# Patient Record
Sex: MALE | Race: WHITE | Employment: FULL TIME | URBAN - METROPOLITAN AREA
[De-identification: names, ages, dates, MRNs, and addresses within clinical notes are randomized per-mention and may not be internally consistent; named-entity substitution may affect disease eponyms.]

---

## 2017-01-10 ENCOUNTER — MED REC SCAN ONLY (OUTPATIENT)
Dept: FAMILY MEDICINE CLINIC | Facility: CLINIC | Age: 62
End: 2017-01-10

## 2017-06-19 ENCOUNTER — MED REC SCAN ONLY (OUTPATIENT)
Dept: FAMILY MEDICINE CLINIC | Facility: CLINIC | Age: 62
End: 2017-06-19

## 2017-08-01 ENCOUNTER — OFFICE VISIT (OUTPATIENT)
Dept: FAMILY MEDICINE CLINIC | Facility: CLINIC | Age: 62
End: 2017-08-01

## 2017-08-01 ENCOUNTER — TELEPHONE (OUTPATIENT)
Dept: FAMILY MEDICINE CLINIC | Facility: CLINIC | Age: 62
End: 2017-08-01

## 2017-08-01 VITALS
HEART RATE: 64 BPM | SYSTOLIC BLOOD PRESSURE: 122 MMHG | OXYGEN SATURATION: 97 % | DIASTOLIC BLOOD PRESSURE: 68 MMHG | BODY MASS INDEX: 27.74 KG/M2 | RESPIRATION RATE: 16 BRPM | WEIGHT: 183 LBS | HEIGHT: 68 IN | TEMPERATURE: 98 F

## 2017-08-01 DIAGNOSIS — Z12.11 SCREENING FOR COLON CANCER: ICD-10-CM

## 2017-08-01 DIAGNOSIS — F40.243 FEAR OF FLYING: ICD-10-CM

## 2017-08-01 DIAGNOSIS — Z13.29 SCREENING FOR ENDOCRINE, NUTRITIONAL, METABOLIC AND IMMUNITY DISORDER: ICD-10-CM

## 2017-08-01 DIAGNOSIS — Z13.0 SCREENING FOR ENDOCRINE, NUTRITIONAL, METABOLIC AND IMMUNITY DISORDER: ICD-10-CM

## 2017-08-01 DIAGNOSIS — Z13.228 SCREENING FOR ENDOCRINE, NUTRITIONAL, METABOLIC AND IMMUNITY DISORDER: ICD-10-CM

## 2017-08-01 DIAGNOSIS — Z12.5 SCREENING FOR PROSTATE CANCER: ICD-10-CM

## 2017-08-01 DIAGNOSIS — Z00.00 ENCOUNTER FOR ANNUAL PHYSICAL EXAM: Primary | ICD-10-CM

## 2017-08-01 DIAGNOSIS — N40.0 BENIGN PROSTATIC HYPERPLASIA, UNSPECIFIED WHETHER LOWER URINARY TRACT SYMPTOMS PRESENT: ICD-10-CM

## 2017-08-01 DIAGNOSIS — Z13.21 ENCOUNTER FOR VITAMIN DEFICIENCY SCREENING: ICD-10-CM

## 2017-08-01 DIAGNOSIS — Z13.21 SCREENING FOR ENDOCRINE, NUTRITIONAL, METABOLIC AND IMMUNITY DISORDER: ICD-10-CM

## 2017-08-01 DIAGNOSIS — Z78.9 VEGAN DIET: ICD-10-CM

## 2017-08-01 PROCEDURE — 99396 PREV VISIT EST AGE 40-64: CPT | Performed by: EMERGENCY MEDICINE

## 2017-08-01 RX ORDER — ALPRAZOLAM 1 MG/1
TABLET ORAL
Qty: 20 TABLET | Refills: 0 | Status: SHIPPED | OUTPATIENT
Start: 2017-08-01 | End: 2019-06-20

## 2017-08-01 NOTE — PROGRESS NOTES
Chief Complaint:   Patient presents with: Annual    HPI:   This is a 63year old male who present for a yearly annual exam    WELL-MALE EXAM     REFILL MEDICATION  Pt states that he has a fear of flying.  Chest chest palpitations and SOB and very anxious past five years? YES        e. Have you had a tetanus shot  in the past 10 years? NO, refuses          f. Does your house have a working       smoke detector? YES        g. Do you have firearms at home? NO        h.  How many sexual partners have /68 (BP Location: Right arm, Patient Position: Sitting, Cuff Size: large)   Pulse 64   Temp 98.3 °F (36.8 °C) (Oral)   Resp 16   Ht 68\"   Wt 183 lb   SpO2 97%   BMI 27.83 kg/m²  Estimated body mass index is 27.83 kg/m² as calculated from the follo vitamin deficiency screening  -     VITAMIN D, 25-HYDROXY; Future  -     VITAMIN B12; Future  -     FOLIC ACID SERUM(FOLATE); Future    Screening for colon cancer  Will initiate Cologuard testing. Information provided.   Given CPT codes and also advised to

## 2017-08-01 NOTE — TELEPHONE ENCOUNTER
Pt given Cologuard informational pamphlet at [address] with instructions to call insurance to determine coverage. Awaiting call back. If covered by patient insurance, completed Cologuard form is located in Dr. Cabrera Lance 'pending' fillomena file to be faxed.

## 2017-08-01 NOTE — PATIENT INSTRUCTIONS
Thank you for choosing [facility]  To Do:  FOR WILLIS ELLIS  1. Call your insurance regarding Cologuard coverage, give the CPT codeLet us know if it not covered so we can refer you for a colonoscopy  2.  If not covered, do stool cards X 3 are best for you   Depression All men in this age group At routine exams   Type 2 diabetes or prediabetes All adults beginning at age 39 and adults without symptoms at any age who are overweight or obese and have 1 or more other risk factors for diabetes A 1 to 3 doses   Influenza (flu) All men in this age group Once a year   Measles, mumps, rubella (MMR) Men in this age group through their late 46s who have no record of these infections or vaccines 1 or 2 dose; ask your healthcare provider   Meningococcal M

## 2017-08-02 ENCOUNTER — LAB ENCOUNTER (OUTPATIENT)
Dept: LAB | Age: 62
End: 2017-08-02
Attending: EMERGENCY MEDICINE
Payer: COMMERCIAL

## 2017-08-02 DIAGNOSIS — Z13.29 SCREENING FOR ENDOCRINE, NUTRITIONAL, METABOLIC AND IMMUNITY DISORDER: ICD-10-CM

## 2017-08-02 DIAGNOSIS — Z13.21 ENCOUNTER FOR VITAMIN DEFICIENCY SCREENING: ICD-10-CM

## 2017-08-02 DIAGNOSIS — Z78.9 VEGAN DIET: ICD-10-CM

## 2017-08-02 DIAGNOSIS — Z12.5 SCREENING FOR PROSTATE CANCER: ICD-10-CM

## 2017-08-02 DIAGNOSIS — Z13.228 SCREENING FOR ENDOCRINE, NUTRITIONAL, METABOLIC AND IMMUNITY DISORDER: ICD-10-CM

## 2017-08-02 DIAGNOSIS — Z13.21 SCREENING FOR ENDOCRINE, NUTRITIONAL, METABOLIC AND IMMUNITY DISORDER: ICD-10-CM

## 2017-08-02 DIAGNOSIS — Z13.0 SCREENING FOR ENDOCRINE, NUTRITIONAL, METABOLIC AND IMMUNITY DISORDER: ICD-10-CM

## 2017-08-02 LAB
25-HYDROXYVITAMIN D (TOTAL): 61.6 NG/ML (ref 30–100)
ALBUMIN SERPL-MCNC: 3.7 G/DL (ref 3.5–4.8)
ALP LIVER SERPL-CCNC: 84 U/L (ref 45–117)
ALT SERPL-CCNC: 24 U/L (ref 17–63)
AST SERPL-CCNC: 23 U/L (ref 15–41)
BASOPHILS # BLD AUTO: 0.07 X10(3) UL (ref 0–0.1)
BASOPHILS NFR BLD AUTO: 1.4 %
BILIRUB SERPL-MCNC: 0.6 MG/DL (ref 0.1–2)
BUN BLD-MCNC: 11 MG/DL (ref 8–20)
CALCIUM BLD-MCNC: 8.9 MG/DL (ref 8.3–10.3)
CHLORIDE: 106 MMOL/L (ref 101–111)
CHOLEST SMN-MCNC: 172 MG/DL (ref ?–200)
CO2: 24 MMOL/L (ref 22–32)
COMPLEXED PSA SERPL-MCNC: 2.81 NG/ML (ref 0.01–4)
CREAT BLD-MCNC: 1.03 MG/DL (ref 0.7–1.3)
EOSINOPHIL # BLD AUTO: 0.14 X10(3) UL (ref 0–0.3)
EOSINOPHIL NFR BLD AUTO: 2.9 %
ERYTHROCYTE [DISTWIDTH] IN BLOOD BY AUTOMATED COUNT: 12.6 % (ref 11.5–16)
FOLATE (FOLIC ACID), SERUM: 17.9 NG/ML (ref 8.7–24)
GLUCOSE BLD-MCNC: 98 MG/DL (ref 70–99)
HAV AB SERPL IA-ACNC: 648 PG/ML (ref 193–986)
HCT VFR BLD AUTO: 43.4 % (ref 37–53)
HDLC SERPL-MCNC: 52 MG/DL (ref 45–?)
HDLC SERPL: 3.31 {RATIO} (ref ?–4.97)
HGB BLD-MCNC: 14.8 G/DL (ref 13–17)
IMMATURE GRANULOCYTE COUNT: 0.01 X10(3) UL (ref 0–1)
IMMATURE GRANULOCYTE RATIO %: 0.2 %
LDLC SERPL CALC-MCNC: 107 MG/DL (ref ?–130)
LDLC SERPL-MCNC: 13 MG/DL (ref 5–40)
LYMPHOCYTES # BLD AUTO: 0.98 X10(3) UL (ref 0.9–4)
LYMPHOCYTES NFR BLD AUTO: 20.2 %
M PROTEIN MFR SERPL ELPH: 6.7 G/DL (ref 6.1–8.3)
MCH RBC QN AUTO: 30.8 PG (ref 27–33.2)
MCHC RBC AUTO-ENTMCNC: 34.1 G/DL (ref 31–37)
MCV RBC AUTO: 90.2 FL (ref 80–99)
MONOCYTES # BLD AUTO: 0.51 X10(3) UL (ref 0.1–0.6)
MONOCYTES NFR BLD AUTO: 10.5 %
NEUTROPHIL ABS PRELIM: 3.13 X10 (3) UL (ref 1.3–6.7)
NEUTROPHILS # BLD AUTO: 3.13 X10(3) UL (ref 1.3–6.7)
NEUTROPHILS NFR BLD AUTO: 64.8 %
NONHDLC SERPL-MCNC: 120 MG/DL (ref ?–130)
PLATELET # BLD AUTO: 213 10(3)UL (ref 150–450)
POTASSIUM SERPL-SCNC: 4.1 MMOL/L (ref 3.6–5.1)
RBC # BLD AUTO: 4.81 X10(6)UL (ref 4.3–5.7)
RED CELL DISTRIBUTION WIDTH-SD: 41.8 FL (ref 35.1–46.3)
SODIUM SERPL-SCNC: 138 MMOL/L (ref 136–144)
TRIGLYCERIDES: 65 MG/DL (ref ?–150)
TSI SER-ACNC: 1.94 MIU/ML (ref 0.35–5.5)
WBC # BLD AUTO: 4.8 X10(3) UL (ref 4–13)

## 2017-08-02 PROCEDURE — 82746 ASSAY OF FOLIC ACID SERUM: CPT | Performed by: EMERGENCY MEDICINE

## 2017-08-02 PROCEDURE — 36415 COLL VENOUS BLD VENIPUNCTURE: CPT | Performed by: EMERGENCY MEDICINE

## 2017-08-02 PROCEDURE — 84153 ASSAY OF PSA TOTAL: CPT | Performed by: EMERGENCY MEDICINE

## 2017-08-02 PROCEDURE — 82607 VITAMIN B-12: CPT | Performed by: EMERGENCY MEDICINE

## 2017-08-02 PROCEDURE — 80050 GENERAL HEALTH PANEL: CPT | Performed by: EMERGENCY MEDICINE

## 2017-08-02 PROCEDURE — 80061 LIPID PANEL: CPT | Performed by: EMERGENCY MEDICINE

## 2017-08-02 PROCEDURE — 82306 VITAMIN D 25 HYDROXY: CPT | Performed by: EMERGENCY MEDICINE

## 2017-08-08 ENCOUNTER — TELEPHONE (OUTPATIENT)
Dept: FAMILY MEDICINE CLINIC | Facility: CLINIC | Age: 62
End: 2017-08-08

## 2017-08-08 NOTE — TELEPHONE ENCOUNTER
LMOM that patient can review labs with nurse. Patient was supposed to inquire through insurance if colo guard screening was covered, please inquire.      Future Appointments  Date Time Provider Arthur Stubbs   9/14/2017 3:30 PM Emmett Quiles MD [id number]

## 2017-12-15 ENCOUNTER — OFFICE VISIT (OUTPATIENT)
Dept: FAMILY MEDICINE CLINIC | Facility: CLINIC | Age: 62
End: 2017-12-15

## 2017-12-15 VITALS
RESPIRATION RATE: 16 BRPM | HEART RATE: 70 BPM | SYSTOLIC BLOOD PRESSURE: 120 MMHG | BODY MASS INDEX: 29.01 KG/M2 | HEIGHT: 67.5 IN | OXYGEN SATURATION: 98 % | WEIGHT: 187 LBS | DIASTOLIC BLOOD PRESSURE: 80 MMHG | TEMPERATURE: 98 F

## 2017-12-15 DIAGNOSIS — H00.022 HORDEOLUM INTERNUM OF RIGHT LOWER EYELID: Primary | ICD-10-CM

## 2017-12-15 PROCEDURE — 99213 OFFICE O/P EST LOW 20 MIN: CPT | Performed by: PHYSICIAN ASSISTANT

## 2017-12-15 RX ORDER — ERYTHROMYCIN 5 MG/G
OINTMENT OPHTHALMIC
Qty: 3.5 G | Refills: 0 | Status: SHIPPED | OUTPATIENT
Start: 2017-12-15 | End: 2018-08-03 | Stop reason: ALTCHOICE

## 2017-12-15 NOTE — PATIENT INSTRUCTIONS
1. Erythromycin  2. Warm compresses four times daily  3. Follow up with ophthalmology   4. If worsening symptoms seek treatment    Sty (or Stye)  A sty is an infection of the oil gland of the eyelid.  It may develop into a small pocket of pus (an abscess) Date Last Reviewed: 8/1/2017  © 7337-8538 [employer] [address]. [address] [facility] [place], [address] [place]. All rights reserved. This information is not intended as a substitute for professional medical care.  Always follow your healthcare professional'

## 2017-12-15 NOTE — PROGRESS NOTES
CHIEF COMPLAINT:   Patient presents with:  Eye Problem: Possible sty Right eye. HPI:   Jaylon Eldridge is a 63year old male who presents with chief complaint of right eye lower lid swelling and redness. Symptoms began  2  days ago.  Symptoms have Smokeless tobacco: Never Used                      Alcohol use: Yes              Comment: rare        REVIEW OF SYSTEMS:   GENERAL: feels well otherwise  SKIN: no rashes  EYES:denies blurred vision or double vision.  See HPI  HENT: denies ear pain, congesti A sty is an infection of the oil gland of the eyelid. It may develop into a small pocket of pus (an abscess). This can cause pain, redness, and swelling.  In early stages, a sty is treated with antibiotic cream, eye drops, or a small towel soaked in warm wa © 3692-7423 [employer] [address]. 1407 [facility] [place], [address] [employer]. All rights reserved. This information is not intended as a substitute for professional medical care. Always follow your healthcare professional's instructions.             Tej

## 2017-12-18 ENCOUNTER — PATIENT MESSAGE (OUTPATIENT)
Dept: FAMILY MEDICINE CLINIC | Facility: CLINIC | Age: 62
End: 2017-12-18

## 2017-12-19 NOTE — TELEPHONE ENCOUNTER
From: Natali Gutierrez  To: Rashad Queen MD  Sent: 12/18/2017 12:46 PM CST  Subject: Non-Urgent Medical Question    I'm trying to find out my blood type. I do not see it in [employer]. How do I get access to this information?

## 2018-03-30 ENCOUNTER — TELEPHONE (OUTPATIENT)
Dept: FAMILY MEDICINE CLINIC | Facility: CLINIC | Age: 63
End: 2018-03-30

## 2018-08-03 ENCOUNTER — HOSPITAL ENCOUNTER (OUTPATIENT)
Dept: GENERAL RADIOLOGY | Age: 63
Discharge: HOME | End: 2018-08-03
Attending: EMERGENCY MEDICINE
Payer: COMMERCIAL

## 2018-08-03 ENCOUNTER — OFFICE VISIT (OUTPATIENT)
Dept: FAMILY MEDICINE CLINIC | Facility: CLINIC | Age: 63
End: 2018-08-03
Payer: COMMERCIAL

## 2018-08-03 ENCOUNTER — LAB ENCOUNTER (OUTPATIENT)
Dept: LAB | Age: 63
End: 2018-08-03
Attending: EMERGENCY MEDICINE
Payer: COMMERCIAL

## 2018-08-03 VITALS
DIASTOLIC BLOOD PRESSURE: 82 MMHG | WEIGHT: 181 LBS | TEMPERATURE: 99 F | HEIGHT: 67.5 IN | HEART RATE: 76 BPM | SYSTOLIC BLOOD PRESSURE: 120 MMHG | BODY MASS INDEX: 28.08 KG/M2 | OXYGEN SATURATION: 97 % | RESPIRATION RATE: 15 BRPM

## 2018-08-03 DIAGNOSIS — Z82.49 FAMILY HISTORY OF HEART DISEASE IN MALE FAMILY MEMBER BEFORE AGE 55: ICD-10-CM

## 2018-08-03 DIAGNOSIS — Z13.0 SCREENING FOR ENDOCRINE, NUTRITIONAL, METABOLIC AND IMMUNITY DISORDER: ICD-10-CM

## 2018-08-03 DIAGNOSIS — Z00.00 ENCOUNTER FOR ANNUAL PHYSICAL EXAM: Primary | ICD-10-CM

## 2018-08-03 DIAGNOSIS — Z13.228 SCREENING FOR ENDOCRINE, NUTRITIONAL, METABOLIC AND IMMUNITY DISORDER: ICD-10-CM

## 2018-08-03 DIAGNOSIS — Z12.11 SCREENING FOR COLON CANCER: ICD-10-CM

## 2018-08-03 DIAGNOSIS — Z13.21 SCREENING FOR ENDOCRINE, NUTRITIONAL, METABOLIC AND IMMUNITY DISORDER: ICD-10-CM

## 2018-08-03 DIAGNOSIS — Z13.29 SCREENING FOR ENDOCRINE, NUTRITIONAL, METABOLIC AND IMMUNITY DISORDER: ICD-10-CM

## 2018-08-03 DIAGNOSIS — Z28.21 REFUSES TETANUS, DIPHTHERIA, AND ACELLULAR PERTUSSIS (TDAP) VACCINATION: ICD-10-CM

## 2018-08-03 DIAGNOSIS — M46.1 SACROILIITIS (HCC): ICD-10-CM

## 2018-08-03 DIAGNOSIS — Z12.5 PROSTATE CANCER SCREENING: ICD-10-CM

## 2018-08-03 LAB
ALBUMIN SERPL-MCNC: 4 G/DL (ref 3.5–4.8)
ALBUMIN/GLOB SERPL: 1.3 {RATIO} (ref 1–2)
ALP LIVER SERPL-CCNC: 87 U/L (ref 45–117)
ALT SERPL-CCNC: 28 U/L (ref 17–63)
ANION GAP SERPL CALC-SCNC: 6 MMOL/L (ref 0–18)
AST SERPL-CCNC: 25 U/L (ref 15–41)
BASOPHILS # BLD AUTO: 0.06 X10(3) UL (ref 0–0.1)
BASOPHILS NFR BLD AUTO: 0.9 %
BILIRUB SERPL-MCNC: 0.6 MG/DL (ref 0.1–2)
BUN BLD-MCNC: 8 MG/DL (ref 8–20)
BUN/CREAT SERPL: 7 (ref 10–20)
CALCIUM BLD-MCNC: 9 MG/DL (ref 8.3–10.3)
CHLORIDE SERPL-SCNC: 106 MMOL/L (ref 101–111)
CHOLEST SMN-MCNC: 153 MG/DL (ref ?–200)
CO2 SERPL-SCNC: 26 MMOL/L (ref 22–32)
COMPLEXED PSA SERPL-MCNC: 3.62 NG/ML (ref 0.01–4)
CREAT BLD-MCNC: 1.14 MG/DL (ref 0.7–1.3)
EOSINOPHIL # BLD AUTO: 0.06 X10(3) UL (ref 0–0.3)
EOSINOPHIL NFR BLD AUTO: 0.9 %
ERYTHROCYTE [DISTWIDTH] IN BLOOD BY AUTOMATED COUNT: 13 % (ref 11.5–16)
GLOBULIN PLAS-MCNC: 3.1 G/DL (ref 2.5–3.7)
GLUCOSE BLD-MCNC: 87 MG/DL (ref 70–99)
HAV AB SERPL IA-ACNC: 719 PG/ML (ref 193–986)
HCT VFR BLD AUTO: 43.9 % (ref 37–53)
HDLC SERPL-MCNC: 49 MG/DL (ref 40–59)
HGB BLD-MCNC: 15.3 G/DL (ref 13–17)
IMMATURE GRANULOCYTE COUNT: 0.01 X10(3) UL (ref 0–1)
IMMATURE GRANULOCYTE RATIO %: 0.1 %
LDLC SERPL CALC-MCNC: 91 MG/DL (ref ?–100)
LYMPHOCYTES # BLD AUTO: 1.24 X10(3) UL (ref 0.9–4)
LYMPHOCYTES NFR BLD AUTO: 17.6 %
M PROTEIN MFR SERPL ELPH: 7.1 G/DL (ref 6.1–8.3)
MCH RBC QN AUTO: 31.2 PG (ref 27–33.2)
MCHC RBC AUTO-ENTMCNC: 34.9 G/DL (ref 31–37)
MCV RBC AUTO: 89.4 FL (ref 80–99)
MONOCYTES # BLD AUTO: 0.7 X10(3) UL (ref 0.1–1)
MONOCYTES NFR BLD AUTO: 9.9 %
NEUTROPHIL ABS PRELIM: 4.97 X10 (3) UL (ref 1.3–6.7)
NEUTROPHILS # BLD AUTO: 4.97 X10(3) UL (ref 1.3–6.7)
NEUTROPHILS NFR BLD AUTO: 70.6 %
NONHDLC SERPL-MCNC: 104 MG/DL (ref ?–130)
OSMOLALITY SERPL CALC.SUM OF ELEC: 284 MOSM/KG (ref 275–295)
PLATELET # BLD AUTO: 216 10(3)UL (ref 150–450)
POTASSIUM SERPL-SCNC: 4 MMOL/L (ref 3.6–5.1)
RBC # BLD AUTO: 4.91 X10(6)UL (ref 4.3–5.7)
RED CELL DISTRIBUTION WIDTH-SD: 42 FL (ref 35.1–46.3)
SODIUM SERPL-SCNC: 138 MMOL/L (ref 136–144)
TRIGL SERPL-MCNC: 66 MG/DL (ref 30–149)
TSI SER-ACNC: 1.32 MIU/ML (ref 0.35–5.5)
VIT D+METAB SERPL-MCNC: 66.9 NG/ML (ref 30–100)
VLDLC SERPL CALC-MCNC: 13 MG/DL (ref 0–30)
WBC # BLD AUTO: 7 X10(3) UL (ref 4–13)

## 2018-08-03 PROCEDURE — 82607 VITAMIN B-12: CPT | Performed by: EMERGENCY MEDICINE

## 2018-08-03 PROCEDURE — 80050 GENERAL HEALTH PANEL: CPT | Performed by: EMERGENCY MEDICINE

## 2018-08-03 PROCEDURE — 84153 ASSAY OF PSA TOTAL: CPT | Performed by: EMERGENCY MEDICINE

## 2018-08-03 PROCEDURE — 99396 PREV VISIT EST AGE 40-64: CPT | Performed by: EMERGENCY MEDICINE

## 2018-08-03 PROCEDURE — 72202 X-RAY EXAM SI JOINTS 3/> VWS: CPT | Performed by: EMERGENCY MEDICINE

## 2018-08-03 PROCEDURE — 36415 COLL VENOUS BLD VENIPUNCTURE: CPT | Performed by: EMERGENCY MEDICINE

## 2018-08-03 PROCEDURE — 80061 LIPID PANEL: CPT | Performed by: EMERGENCY MEDICINE

## 2018-08-03 PROCEDURE — 82306 VITAMIN D 25 HYDROXY: CPT | Performed by: EMERGENCY MEDICINE

## 2018-08-03 NOTE — PATIENT INSTRUCTIONS
Thank you for choosing [facility]  To Do:  FOR Nayeli ELLIS    · Follow up with cardiology as scheduled, BP meds per  Cardiology  · Have blood tests done after fasting  · Follow up yearly for annual physical  · Flu sshot in the fall  · Xr cancer All men in this age group Flexible sigmoidoscopy every 5 years, or colonoscopy every 10 years, or double-contrast barium enema every 5 years; yearly fecal occult blood test or fecal immunochemical test; or a stool DNA test as often as your healthcar your healthcare provider 2 doses given at least 6 months apart   Hepatitis B Men at increased risk for infection – talk with your healthcare provider 3 doses over 6 months; second dose should be given 1 month after the first dose; the third dose should be [address]. 1407 [facility] [place], [address] [place] [address]. All rights reserved. This information is not intended as a substitute for professional medical care. Always follow your healthcare professional's instructions.

## 2018-08-03 NOTE — PROGRESS NOTES
Chief Complaint:   Patient presents with:  Physical: Annual physical     HPI:   This is a 66year old male who present for a yearly annual exam    WELL-MALE EXAM     1. Age:   66   2. Have you had any of the following problems:         a.  High blood pr date: TONSILLECTOMY  Social History:  Smoking status: Never Smoker                                                              Smokeless tobacco: Never Used                      Alcohol use:  Yes              Comment: rare    Family History:  Family Histor supple, no adenopathy, no thyromegaly  LUNGS: clear to auscultation, no RRW  CARDIO: RRR without murmur  EXTREMITIES: no cyanosis, clubbing or edema  GI:soft Nontender Normoactive BS.   No palpable masses no guarding rigidity no rebound tenderness  BACK: Ba file.      · Follow up with cardiology as scheduled, BP meds per  Cardiology  · Have blood tests done after fasting  · Follow up yearly for annual physical  · Flu sshot in the fall  · Xray of back today  · Arrange for physical therapy  · OTC tylenol or Mot

## 2018-08-06 ENCOUNTER — TELEPHONE (OUTPATIENT)
Dept: FAMILY MEDICINE CLINIC | Facility: CLINIC | Age: 63
End: 2018-08-06

## 2018-08-06 DIAGNOSIS — R97.20 ELEVATED PSA: Primary | ICD-10-CM

## 2018-08-06 NOTE — TELEPHONE ENCOUNTER
MC    PSR: OK to lync me if/when pt calls back today, while I am in triage. Thank you. Urology referral placed for Dr. Shirley Casillas.

## 2018-08-06 NOTE — TELEPHONE ENCOUNTER
----- Message from Raghavendra Mares MD sent at 8/6/2018 12:38 PM CDT -----  PSA has increased significantly from 1 year ago.  Pls refer patient to Urology, Dr. Jerry Watt    Otherwise, labs all stable    XR SACROILIAC JOINTS (MIN 3 VIEWS) (CPT=72202)   Order: 21

## 2018-08-07 PROBLEM — M46.1 SACROILIITIS (HCC): Status: ACTIVE | Noted: 2018-08-07

## 2018-08-13 NOTE — TELEPHONE ENCOUNTER
Spoke with pt regarding results and instructions listed below. Pt verbalizes understanding. Pt requested that he continue to follow up with his urologist through [id number] [facility], Dr. Saleem Canada in [place].  Requested that labs be sent to his urolo

## 2018-09-11 ENCOUNTER — OFFICE VISIT (OUTPATIENT)
Dept: PHYSICAL THERAPY | Age: 63
End: 2018-09-11
Attending: EMERGENCY MEDICINE
Payer: COMMERCIAL

## 2018-09-11 DIAGNOSIS — M46.1 SACROILIITIS (HCC): ICD-10-CM

## 2018-09-11 PROCEDURE — 97530 THERAPEUTIC ACTIVITIES: CPT

## 2018-09-11 PROCEDURE — 97161 PT EVAL LOW COMPLEX 20 MIN: CPT

## 2018-09-11 NOTE — PROGRESS NOTES
LOWER EXTREMITY EVALUATION:   Referring Physician: Dr. Timi Bridges  Diagnosis: Left Hip Capsular Restriction     Date of Service: 9/11/2018     PATIENT Shawanda Landis is a 66year old y/o male who presents to therapy today with complaints of the hips and pelvis. He toes out on the left side. There is an increase in boggy effusion to palpation along the lumbar spine. Gait: The patient ambulates with a decrease in hip extension across the bilateral hips.     Palpation: No increase in pain to postural limitations and be able to correct them independently. 3. Patient will have an increase in posterior hip capsule mobility to min limitations in order to return to walking, squatting, and exercising.     4. Patient will have an increase in bilate

## 2018-09-14 ENCOUNTER — APPOINTMENT (OUTPATIENT)
Dept: PHYSICAL THERAPY | Age: 63
End: 2018-09-14
Attending: EMERGENCY MEDICINE
Payer: COMMERCIAL

## 2018-09-19 ENCOUNTER — APPOINTMENT (OUTPATIENT)
Dept: PHYSICAL THERAPY | Age: 63
End: 2018-09-19
Attending: EMERGENCY MEDICINE
Payer: COMMERCIAL

## 2018-09-21 ENCOUNTER — APPOINTMENT (OUTPATIENT)
Dept: PHYSICAL THERAPY | Age: 63
End: 2018-09-21
Attending: EMERGENCY MEDICINE
Payer: COMMERCIAL

## 2018-09-26 ENCOUNTER — APPOINTMENT (OUTPATIENT)
Dept: PHYSICAL THERAPY | Age: 63
End: 2018-09-26
Attending: EMERGENCY MEDICINE
Payer: COMMERCIAL

## 2018-09-26 PROCEDURE — 97140 MANUAL THERAPY 1/> REGIONS: CPT

## 2018-09-26 PROCEDURE — 97110 THERAPEUTIC EXERCISES: CPT

## 2018-09-26 NOTE — PROGRESS NOTES
Dx: Left Hip Stiffness and pain         Authorized # of Visits:  8         Next MD visit: none scheduled  Fall Risk: standard         Precautions: n/a             Subjective: States that he is continuing to have left leg pain.   Describes this more as a mus

## 2018-09-28 ENCOUNTER — OFFICE VISIT (OUTPATIENT)
Dept: PHYSICAL THERAPY | Age: 63
End: 2018-09-28
Attending: EMERGENCY MEDICINE
Payer: COMMERCIAL

## 2018-09-28 PROCEDURE — 97140 MANUAL THERAPY 1/> REGIONS: CPT

## 2018-09-28 PROCEDURE — 97110 THERAPEUTIC EXERCISES: CPT

## 2018-09-28 NOTE — PROGRESS NOTES
Dx: Left Hip Stiffness and pain         Authorized # of Visits:  8         Next MD visit: none scheduled  Fall Risk: standard         Precautions: n/a             Subjective: States that he is continuing to have left leg pain.   Having some knee pain from t Treatment: 40 min  Total Treatment Time: 40 min

## 2018-10-03 ENCOUNTER — OFFICE VISIT (OUTPATIENT)
Dept: PHYSICAL THERAPY | Age: 63
End: 2018-10-03
Attending: EMERGENCY MEDICINE
Payer: COMMERCIAL

## 2018-10-03 PROCEDURE — 97110 THERAPEUTIC EXERCISES: CPT

## 2018-10-03 PROCEDURE — 97140 MANUAL THERAPY 1/> REGIONS: CPT

## 2018-10-03 NOTE — PROGRESS NOTES
Dx: Left Hip Stiffness and pain         Authorized # of Visits:  8         Next MD visit: none scheduled  Fall Risk: standard         Precautions: n/a             Subjective: States that the hip is stiff.   Had to crawl around the floor a bit at work and no hip flexor in order to return to long distance gait and exercise. Plan: Assess response and progress as tolerated. Charges: TherEx 2 (25 min);  Manual PT 1 (15 min)       Total Timed Treatment: 40 min  Total Treatment Time: 40 min

## 2018-10-05 ENCOUNTER — OFFICE VISIT (OUTPATIENT)
Dept: PHYSICAL THERAPY | Age: 63
End: 2018-10-05
Attending: EMERGENCY MEDICINE
Payer: COMMERCIAL

## 2018-10-05 PROCEDURE — 97110 THERAPEUTIC EXERCISES: CPT

## 2018-10-05 PROCEDURE — 97140 MANUAL THERAPY 1/> REGIONS: CPT

## 2018-10-05 NOTE — PROGRESS NOTES
Dx: Left Hip Stiffness and pain         Authorized # of Visits:  8         Next MD visit: none scheduled  Fall Risk: standard         Precautions: n/a             Subjective: Feeling better. Some stiffness. Left hip more-so than the right.       Objective squatting, and exercising. 4. Patient will have an increase in bilateral hip strength to assist with returning to exercise routine  5.  Patient will demonstrate an increase in MLT of of the hamstring rectus femoris, and hip flexor in order to return to l

## 2018-10-08 ENCOUNTER — TELEPHONE (OUTPATIENT)
Dept: FAMILY MEDICINE CLINIC | Facility: CLINIC | Age: 63
End: 2018-10-08

## 2018-10-08 DIAGNOSIS — M25.552 PAIN OF LEFT HIP JOINT: Primary | ICD-10-CM

## 2018-10-08 NOTE — TELEPHONE ENCOUNTER
Pt states that when he was at physical therapy last they recommended him to get an xray of his left hip.

## 2018-10-08 NOTE — TELEPHONE ENCOUNTER
Please see below message. Last PT appointment: 10/5/18. Please advise on hip xray for patient. Thank you!

## 2018-10-09 NOTE — TELEPHONE ENCOUNTER
VM left for patient. I explained that I would send him a my chart with instructions from the doctor. Call back or send my chart w/ questions or concerns.

## 2018-10-09 NOTE — TELEPHONE ENCOUNTER
PSR: When patient calls back please verify PCP. PCP listed is Dr. Billy Davis however, patient has been seeing Dr. Gill Blizzard. Thank you! LM on pt cell phone to call the office back. Unable to leave detailed message per HIPAA.

## 2018-10-09 NOTE — TELEPHONE ENCOUNTER
Pt called back. Pt states that he doesn't really have a PCP but would like  to be his PCP.   I changed his PCP to correct one in his chart

## 2018-10-10 ENCOUNTER — OFFICE VISIT (OUTPATIENT)
Dept: PHYSICAL THERAPY | Age: 63
End: 2018-10-10
Attending: EMERGENCY MEDICINE
Payer: COMMERCIAL

## 2018-10-10 PROCEDURE — 97140 MANUAL THERAPY 1/> REGIONS: CPT

## 2018-10-10 PROCEDURE — 97110 THERAPEUTIC EXERCISES: CPT

## 2018-10-10 NOTE — PROGRESS NOTES
Dx: Left Hip Stiffness and pain         Authorized # of Visits:  8         Next MD visit: none scheduled  Fall Risk: standard         Precautions: n/a             Subjective: Feeling better. Some stiffness. Left hip more-so than the right.       Objective beyond a \"stiffness. \"        Goals (8 visits):    1. Increase FOTO assessment > 11% from INE to DC. 2. Patient will be aware of postural limitations and be able to correct them independently.     3. Patient will have an increase in posterior hip capsule

## 2018-10-12 ENCOUNTER — OFFICE VISIT (OUTPATIENT)
Dept: PHYSICAL THERAPY | Age: 63
End: 2018-10-12
Attending: EMERGENCY MEDICINE
Payer: COMMERCIAL

## 2018-10-12 PROCEDURE — 97140 MANUAL THERAPY 1/> REGIONS: CPT

## 2018-10-12 PROCEDURE — 97110 THERAPEUTIC EXERCISES: CPT

## 2018-10-12 NOTE — PROGRESS NOTES
Dx: Left Hip Stiffness and pain         Authorized # of Visits:  8         Next MD visit: none scheduled  Fall Risk: standard         Precautions: n/a             Subjective: Hip stiffness continues. Objective: Treatment progressed per treatment log. capsule glides Posterior and Inferior capsule glides Posterior and Inferior capsule glides    ITB Roll x 10 min ITB Roll x 10 min ITB Roll x 10 min ITB Roll x 10 min ITB Roll x 10 min ITB Roll x 10 min        Assessment: Tolerated treatment well without in

## 2018-10-13 ENCOUNTER — HOSPITAL ENCOUNTER (OUTPATIENT)
Dept: GENERAL RADIOLOGY | Age: 63
Discharge: HOME | End: 2018-10-13
Attending: EMERGENCY MEDICINE
Payer: COMMERCIAL

## 2018-10-13 DIAGNOSIS — M25.552 PAIN OF LEFT HIP JOINT: ICD-10-CM

## 2018-10-13 PROCEDURE — 73502 X-RAY EXAM HIP UNI 2-3 VIEWS: CPT | Performed by: EMERGENCY MEDICINE

## 2018-10-17 ENCOUNTER — OFFICE VISIT (OUTPATIENT)
Dept: PHYSICAL THERAPY | Age: 63
End: 2018-10-17
Attending: EMERGENCY MEDICINE
Payer: COMMERCIAL

## 2018-10-17 PROCEDURE — 97140 MANUAL THERAPY 1/> REGIONS: CPT

## 2018-10-17 PROCEDURE — 97110 THERAPEUTIC EXERCISES: CPT

## 2018-10-17 NOTE — PROGRESS NOTES
Dx: Left Hip Stiffness and pain         Authorized # of Visits:  8         Next MD visit: none scheduled  Fall Risk: standard         Precautions: n/a             Subjective: Hip stiffness continues. Feels he's 50% improved.   Presents following radiograph Unilateral ROws 36# x 20 CC: Unilateral ROws 36# x 20       KettleBall Squats 10# x 20 KettleBall Squats 10# x 20 KettleBall Squats 10# x 20   Manual PT (15 min) Manual PT (15 min) Manual PT (15 min) Manual PT (15 min) Manual PT (15 min) Manual PT (15 min)

## 2018-10-24 ENCOUNTER — OFFICE VISIT (OUTPATIENT)
Dept: PHYSICAL THERAPY | Age: 63
End: 2018-10-24
Attending: EMERGENCY MEDICINE
Payer: COMMERCIAL

## 2018-10-24 PROCEDURE — 97140 MANUAL THERAPY 1/> REGIONS: CPT

## 2018-10-24 PROCEDURE — 97110 THERAPEUTIC EXERCISES: CPT

## 2018-10-24 NOTE — PROGRESS NOTES
Dx: Left Hip Stiffness and pain         Authorized # of Visits:  8         Next MD visit: none scheduled  Fall Risk: standard         Precautions: n/a             Subjective: Hip stiffness continues. Feels he's 50% improved.   Presents following radiograph well with all activities. Goals (8 visits):    1. Increase FOTO assessment > 11% from INE to DC. 2. Patient will be aware of postural limitations and be able to correct them independently (progressing).     3. Patient will have an increase in poster

## 2019-02-04 ENCOUNTER — PATIENT OUTREACH (OUTPATIENT)
Dept: FAMILY MEDICINE CLINIC | Facility: CLINIC | Age: 64
End: 2019-02-04

## 2019-03-01 ENCOUNTER — MED REC SCAN ONLY (OUTPATIENT)
Dept: FAMILY MEDICINE CLINIC | Facility: CLINIC | Age: 64
End: 2019-03-01

## 2019-04-05 ENCOUNTER — HOSPITAL ENCOUNTER (OUTPATIENT)
Dept: GENERAL RADIOLOGY | Age: 64
Discharge: HOME | End: 2019-04-05
Attending: ORTHOPAEDIC SURGERY
Payer: COMMERCIAL

## 2019-04-05 ENCOUNTER — LAB ENCOUNTER (OUTPATIENT)
Dept: LAB | Age: 64
End: 2019-04-05
Attending: ORTHOPAEDIC SURGERY
Payer: COMMERCIAL

## 2019-04-05 DIAGNOSIS — R05.9 COUGH: Primary | ICD-10-CM

## 2019-04-05 DIAGNOSIS — I10 HYPERTENSION: ICD-10-CM

## 2019-04-05 DIAGNOSIS — R05.9 COUGH: ICD-10-CM

## 2019-04-05 DIAGNOSIS — N39.0 UTI (URINARY TRACT INFECTION): ICD-10-CM

## 2019-04-05 DIAGNOSIS — M16.12 UNILATERAL PRIMARY OSTEOARTHRITIS, LEFT HIP: ICD-10-CM

## 2019-04-05 DIAGNOSIS — I10 HYPERTENSION, UNSPECIFIED TYPE: ICD-10-CM

## 2019-04-05 DIAGNOSIS — E78.5 HYPERLIPIDEMIA, UNSPECIFIED HYPERLIPIDEMIA TYPE: ICD-10-CM

## 2019-04-05 DIAGNOSIS — Z01.818 PREOP TESTING: ICD-10-CM

## 2019-04-05 DIAGNOSIS — Z79.01 LONG TERM CURRENT USE OF ANTICOAGULANT: ICD-10-CM

## 2019-04-05 PROCEDURE — 71046 X-RAY EXAM CHEST 2 VIEWS: CPT | Performed by: ORTHOPAEDIC SURGERY

## 2019-04-05 PROCEDURE — 80061 LIPID PANEL: CPT | Performed by: EMERGENCY MEDICINE

## 2019-04-08 ENCOUNTER — OFFICE VISIT (OUTPATIENT)
Dept: FAMILY MEDICINE CLINIC | Facility: CLINIC | Age: 64
End: 2019-04-08
Payer: COMMERCIAL

## 2019-04-08 VITALS
OXYGEN SATURATION: 99 % | WEIGHT: 186 LBS | RESPIRATION RATE: 17 BRPM | HEART RATE: 79 BPM | SYSTOLIC BLOOD PRESSURE: 122 MMHG | BODY MASS INDEX: 28.85 KG/M2 | HEIGHT: 67.5 IN | DIASTOLIC BLOOD PRESSURE: 86 MMHG

## 2019-04-08 DIAGNOSIS — M16.12 PRIMARY OSTEOARTHRITIS OF LEFT HIP: ICD-10-CM

## 2019-04-08 DIAGNOSIS — Z01.818 PRE-OP EXAM: Primary | ICD-10-CM

## 2019-04-08 DIAGNOSIS — I10 ESSENTIAL HYPERTENSION: ICD-10-CM

## 2019-04-08 DIAGNOSIS — E78.5 HYPERLIPIDEMIA, UNSPECIFIED HYPERLIPIDEMIA TYPE: ICD-10-CM

## 2019-04-08 PROCEDURE — 93000 ELECTROCARDIOGRAM COMPLETE: CPT | Performed by: EMERGENCY MEDICINE

## 2019-04-08 PROCEDURE — 99244 OFF/OP CNSLTJ NEW/EST MOD 40: CPT | Performed by: EMERGENCY MEDICINE

## 2019-04-08 NOTE — PATIENT INSTRUCTIONS
Thank you for choosing [facility]  To Do:  FOR Pedro ELLIS    · Follow up with cardiiology as scheduled  · May need to start cholesterol medicine if cholesterol or LDL is high  · Follow up for annual physical in August G needed. · You may receive blood donated by another person. This blood is screened to rule out disease. · A drug called epoetin abbie can be given before surgery.  This drug stimulates the body to produce red blood cells, sometimes reducing the need for tra Always follow your healthcare professional's instructions.

## 2019-04-08 NOTE — PROGRESS NOTES
Pau Contreras is a 60year old male who presents for a pre-operative physical exam.   HPI related to surgery:   Pau Contreras is scheduled for a Left hip replacement procedure at [address] [place] [address] on April 17 to be performed by Dr. Wanda Henriquez Cholecalciferol (VITAMIN D) 2000 units Oral Tab Take 5,000 Units by mouth daily. Disp:  Rfl:    Multiple Vitamins-Minerals (MULTIVITAMIN OR) Take  by mouth. Disp:  Rfl:    Turmeric Does not apply Powder by Does not apply route.  Disp:  Rfl:    SAW PALMETT Rate, AXIS and interval noted. Rate is 72   Rhythm is: Sinus. No acute changes noted.         Recent Results (from the past 504 hour(s))   BASIC METABOLIC PANEL (8)    Collection Time: 04/05/19  2:39 PM   Result Value Ref Range    Glucose 82 70 - 99 m Neutrophil Absolute 3.83 1.50 - 7.70 x10(3) uL    Lymphocyte Absolute 1.40 1.00 - 4.00 x10(3) uL    Monocyte Absolute 0.70 0.10 - 1.00 x10(3) uL    Eosinophil Absolute 0.11 0.00 - 0.70 x10(3) uL    Basophil Absolute 0.04 0.00 - 0.20 x10(3) uL    Immature G

## 2019-04-15 ENCOUNTER — TELEPHONE (OUTPATIENT)
Dept: FAMILY MEDICINE CLINIC | Facility: CLINIC | Age: 64
End: 2019-04-15

## 2019-04-15 NOTE — TELEPHONE ENCOUNTER
Pts pre op report and EKG were faxed to Dr. Meriam Simmonds at [phone]. Fax confirmation was received.

## 2019-04-19 ENCOUNTER — MED REC SCAN ONLY (OUTPATIENT)
Dept: FAMILY MEDICINE CLINIC | Facility: CLINIC | Age: 64
End: 2019-04-19

## 2019-05-05 ENCOUNTER — MED REC SCAN ONLY (OUTPATIENT)
Dept: FAMILY MEDICINE CLINIC | Facility: CLINIC | Age: 64
End: 2019-05-05

## 2019-06-20 NOTE — PATIENT INSTRUCTIONS
Thank you for choosing [facility]  To Do:  FOR Carole ELLIS    · Start lexapro  · Arrange for therapy and counseling, Maryuri Eldridge  · Follow up in 2 weeks  · Arrange to follow up with psychiatry  · Call if with any medication problems  · Mp Murphy themselves. This may make them more likely to get sick. Date Last Reviewed: 1/1/2017  © 4312-8574 [employer]. 1407 [facility] [place], [address] [place] [address]. All rights reserved.  This information is not intended as a substitute for professional hotline and ask what action to take. In an emergency, don’t hesitate to call the police. Resources:  · [place] of Mental [employer].  · [employer] on Mental https://corrine.org/. org   · Burke Vela blood pressure, cancer, pain, and other health problems. So tell your doctor about all medicines you take. But never stop taking one without your doctor’s OK. Physical illness  Being sick can make anyone feel frustrated and sad.  But some health problems m your thinking, not letting you see the world the way it really is.  Therapy can give you:  · Insight about your emotions  · New tools for dealing with your problems  · Emotional support for making progress  Getting better takes time  Talk therapy can help y

## 2019-06-20 NOTE — PROGRESS NOTES
Chief Complaint:   Patient presents with:  Concentration: C/o lack of concentration and not focusing X 3 weeks     HPI:   This is a 60year old male     Elzbieta 14 to a long history of depression and OCD.  Was treated in the past in mid 82 Heart Disorder Father      Allergies:    Ceclor [Cefaclor]       HIVES  Levaquin [Levofloxa*    RASH  Current Meds:    Current Outpatient Medications on File Prior to Visit:  amLODIPine Besylate 10 MG Oral Tab Take 1 tablet (10 mg total) by mouth daily.  Vinicius Turner alert and oriented. Dress and hygiene are fair. Looks his stated age. Calm and cooperative. Good eye contact. No psychomotor agitation or retardation. Speech is soft. No pressure of speech. No thought disorder. Thoughts are goal directed. Affect depressed. treatment options and discussion of her condition and plan of care.

## 2019-06-25 ENCOUNTER — MED REC SCAN ONLY (OUTPATIENT)
Dept: FAMILY MEDICINE CLINIC | Facility: CLINIC | Age: 64
End: 2019-06-25

## 2019-07-17 ENCOUNTER — TELEPHONE (OUTPATIENT)
Dept: FAMILY MEDICINE CLINIC | Facility: CLINIC | Age: 64
End: 2019-07-17

## 2019-07-17 DIAGNOSIS — F33.1 MODERATE EPISODE OF RECURRENT MAJOR DEPRESSIVE DISORDER (HCC): ICD-10-CM

## 2019-07-17 RX ORDER — ESCITALOPRAM OXALATE 10 MG/1
10 TABLET ORAL DAILY
Qty: 30 TABLET | Refills: 0 | Status: SHIPPED | OUTPATIENT
Start: 2019-07-17 | End: 2019-07-19

## 2019-07-17 NOTE — TELEPHONE ENCOUNTER
Medication(s) to Refill:   Requested Prescriptions     Pending Prescriptions Disp Refills   • escitalopram 10 MG Oral Tab 30 tablet 0     Sig: Take 1 tablet (10 mg total) by mouth daily.  Start with 1/2 tab once a day for 1 week, then 1 tab daily         Re

## 2019-07-18 NOTE — TELEPHONE ENCOUNTER
escitalopram refilled x 30 d only  Needs office visit before next refill if unable to establish care with psychiatry  Was referred to psychiatry on last OV

## 2019-07-19 RX ORDER — ESCITALOPRAM OXALATE 10 MG/1
10 TABLET ORAL DAILY
Qty: 30 TABLET | Refills: 0 | Status: SHIPPED | OUTPATIENT
Start: 2019-07-19 | End: 2019-07-25 | Stop reason: ALTCHOICE

## 2019-07-19 NOTE — TELEPHONE ENCOUNTER
Appears refill was sent on 7/17 to [facility]. I called [facility] and cancelled the script.  Resent to [facility].

## 2019-07-19 NOTE — TELEPHONE ENCOUNTER
[facility] pharmacy called for an refill of patient's Lexapro. Please advise.

## 2019-07-29 ENCOUNTER — OFFICE VISIT (OUTPATIENT)
Dept: FAMILY MEDICINE CLINIC | Facility: CLINIC | Age: 64
End: 2019-07-29
Payer: COMMERCIAL

## 2019-07-29 VITALS
BODY MASS INDEX: 27 KG/M2 | DIASTOLIC BLOOD PRESSURE: 82 MMHG | SYSTOLIC BLOOD PRESSURE: 114 MMHG | OXYGEN SATURATION: 97 % | RESPIRATION RATE: 17 BRPM | HEART RATE: 74 BPM | WEIGHT: 173 LBS

## 2019-07-29 DIAGNOSIS — F33.1 MODERATE EPISODE OF RECURRENT MAJOR DEPRESSIVE DISORDER (HCC): ICD-10-CM

## 2019-07-29 PROCEDURE — 99214 OFFICE O/P EST MOD 30 MIN: CPT | Performed by: EMERGENCY MEDICINE

## 2019-07-29 RX ORDER — ESCITALOPRAM OXALATE 5 MG/1
2.5 TABLET ORAL DAILY
Qty: 30 TABLET | Refills: 0 | Status: SHIPPED | OUTPATIENT
Start: 2019-07-29 | End: 2019-08-12

## 2019-07-29 RX ORDER — ALPRAZOLAM 1 MG/1
TABLET ORAL
Qty: 20 TABLET | Refills: 0 | Status: SHIPPED
Start: 2019-07-29 | End: 2019-08-26 | Stop reason: ALTCHOICE

## 2019-07-29 NOTE — PATIENT INSTRUCTIONS
Thank you for choosing [facility]  To Do:  FOR Zach Fly ELLIS    · Ok to start lexapro at a lower dose   · Follow up in 2 weeks  · Ok to take Xanax as needed.   · Arrange follow up with Psychiatry          Asiya Davis,   Psychiatry [address] [facility] [place], [address] [place] [address]. All rights reserved. This information is not intended as a substitute for professional medical care. Always follow your healthcare professional's instructions.

## 2019-07-29 NOTE — PROGRESS NOTES
Chief Complaint:   Patient presents with:  Side Effect: C/o side effects with Lexapro     HPI:   This is a 60year old male       DEPRESSION  Was Rx'd Lexapro recently. Lexapro cause a lot of restlessness, felt \"hyped\" up.  Had episodes of feeling hyped mouth. Disp:  Rfl:    ASTAXANTHIN OR Take 4 mg by mouth daily. Disp:  Rfl:    [DISCONTINUED] ALPRAZolam 1 MG Oral Tab Take 1 hour before air trave Disp: 20 tablet Rfl: 0     No current facility-administered medications on file prior to visit.     Counseli ALPRAZolam 1 MG Oral Tab; Take 1 tablet daily as needed  Dispense: 20 tablet; Refill: 0    Long discussion about pathophysiology depression. Treatment options discussed. Patient opted for treatment with medications.   Risks and benefits of medication(s) d

## 2019-07-31 PROBLEM — F33.1 MODERATE EPISODE OF RECURRENT MAJOR DEPRESSIVE DISORDER (HCC): Status: ACTIVE | Noted: 2019-07-31

## 2019-08-12 NOTE — PATIENT INSTRUCTIONS
Thank you for choosing [facility]  To Do:  FOR WILLIS ELLIS    · Increase lexapro to 5 mg daily  · Take clonazepam at night for sleep  · Stop xanax for now and use clonazepam instead  · Follow up in 2 weeks  · Arrange follow up with psych stomachaches, or other aches and pains  · Drain your body of energy  Depression and other illness  It is common for people who have chronic health problems to also have depression. It can often be hard to tell which one caused the other.  A person might bec your problems  · Emotional support for making progress  Getting better takes time  Talk therapy can help you feel better. But change doesn’t happen right away. Depression takes away your energy and motivation.  So it can be hard to feel like going to therap

## 2019-08-12 NOTE — PROGRESS NOTES
Chief Complaint:   Patient presents with:  Depression: F/u    HPI:   This is a 60year old male     DEPRESSION  reStarted Lexapro 2.5 mg daily. Had increased anxiety the first 1 week. Now adapted to symptoms. Feels like the lexapro \"hyped\" him up.  C/O Disp: 20 tablet Rfl: 0   amLODIPine Besylate 10 MG Oral Tab Take 1 tablet (10 mg total) by mouth daily. Disp: 90 tablet Rfl: 1   carvedilol 12.5 MG Oral Tab Take 1 tablet (12.5 mg total) by mouth 2 (two) times daily.  Disp: 180 tablet Rfl: 3   Cholecalcifer speech. No thought disorder. Thoughts are goal directed. Affect is anxious, blunted. The patient denied any audiovisual hallucinations. No delusions noted. Insight and judgment are fair. Impulse control is fair.  The patient is cognitively intact    No res

## 2019-08-26 ENCOUNTER — OFFICE VISIT (OUTPATIENT)
Dept: FAMILY MEDICINE CLINIC | Facility: CLINIC | Age: 64
End: 2019-08-26
Payer: COMMERCIAL

## 2019-08-26 VITALS
BODY MASS INDEX: 26.37 KG/M2 | WEIGHT: 168 LBS | OXYGEN SATURATION: 96 % | HEIGHT: 67 IN | HEART RATE: 78 BPM | TEMPERATURE: 98 F | SYSTOLIC BLOOD PRESSURE: 130 MMHG | DIASTOLIC BLOOD PRESSURE: 88 MMHG | RESPIRATION RATE: 17 BRPM

## 2019-08-26 DIAGNOSIS — I10 ESSENTIAL HYPERTENSION: ICD-10-CM

## 2019-08-26 DIAGNOSIS — Z28.21 REFUSES TETANUS, DIPHTHERIA, AND ACELLULAR PERTUSSIS (TDAP) VACCINATION: ICD-10-CM

## 2019-08-26 DIAGNOSIS — Z12.11 COLON CANCER SCREENING: ICD-10-CM

## 2019-08-26 DIAGNOSIS — Z00.00 ENCOUNTER FOR ANNUAL PHYSICAL EXAM: Primary | ICD-10-CM

## 2019-08-26 DIAGNOSIS — F33.1 MODERATE EPISODE OF RECURRENT MAJOR DEPRESSIVE DISORDER (HCC): ICD-10-CM

## 2019-08-26 DIAGNOSIS — E78.5 HYPERLIPIDEMIA, UNSPECIFIED HYPERLIPIDEMIA TYPE: ICD-10-CM

## 2019-08-26 PROCEDURE — 99396 PREV VISIT EST AGE 40-64: CPT | Performed by: EMERGENCY MEDICINE

## 2019-08-26 RX ORDER — ESCITALOPRAM OXALATE 5 MG/1
5 TABLET ORAL DAILY
Qty: 90 TABLET | Refills: 0 | Status: CANCELLED | OUTPATIENT
Start: 2019-08-26

## 2019-08-26 RX ORDER — ESCITALOPRAM OXALATE 10 MG/1
10 TABLET ORAL DAILY
Qty: 30 TABLET | Refills: 1 | Status: SHIPPED | OUTPATIENT
Start: 2019-08-26 | End: 2019-08-27

## 2019-08-26 NOTE — PATIENT INSTRUCTIONS
Thank you for choosing [facility]  To Do:  FOR Francia Uriarte    · Bring wife to next visit  · Increase Lexapro to 10 mg once a day in the next 1 week  · Follow up in 1 month  · Continue with Clonazepam as needed for sleep and anxiety  · Ar years; yearly fecal occult blood test or fecal immunochemical test; or a stool DNA test as often as your healthcare provider advises; talk with your healthcare provider about which tests are best for you   Depression All men in this age group At routine ex provider 3 doses over 6 months; second dose should be given 1 month after the first dose; the third dose should be given at least 2 months after the second dose and at least 4 months after the first dose   Haemophilus influenzae Type B (HIB) Men at [facility] professional medical care. Always follow your healthcare professional's instructions.

## 2019-08-27 RX ORDER — ESCITALOPRAM OXALATE 10 MG/1
10 TABLET ORAL DAILY
Qty: 90 TABLET | Refills: 0 | Status: SHIPPED | OUTPATIENT
Start: 2019-08-27 | End: 2019-12-16

## 2019-10-14 ENCOUNTER — APPOINTMENT (OUTPATIENT)
Dept: LAB | Age: 64
End: 2019-10-14
Attending: EMERGENCY MEDICINE
Payer: COMMERCIAL

## 2019-10-14 DIAGNOSIS — Z12.5 PROSTATE CANCER SCREENING: ICD-10-CM

## 2019-10-14 DIAGNOSIS — F33.1 MODERATE EPISODE OF RECURRENT MAJOR DEPRESSIVE DISORDER (HCC): ICD-10-CM

## 2019-10-14 PROCEDURE — 84153 ASSAY OF PSA TOTAL: CPT | Performed by: EMERGENCY MEDICINE

## 2019-10-14 PROCEDURE — 80307 DRUG TEST PRSMV CHEM ANLYZR: CPT | Performed by: EMERGENCY MEDICINE

## 2019-10-14 PROCEDURE — 84443 ASSAY THYROID STIM HORMONE: CPT | Performed by: EMERGENCY MEDICINE

## 2019-10-14 PROCEDURE — 36415 COLL VENOUS BLD VENIPUNCTURE: CPT | Performed by: EMERGENCY MEDICINE

## 2019-10-15 ENCOUNTER — MED REC SCAN ONLY (OUTPATIENT)
Dept: FAMILY MEDICINE CLINIC | Facility: CLINIC | Age: 64
End: 2019-10-15

## 2019-12-16 DIAGNOSIS — F33.1 MODERATE EPISODE OF RECURRENT MAJOR DEPRESSIVE DISORDER (HCC): ICD-10-CM

## 2019-12-17 RX ORDER — ESCITALOPRAM OXALATE 10 MG/1
TABLET ORAL
Qty: 90 TABLET | Refills: 0 | Status: SHIPPED | OUTPATIENT
Start: 2019-12-17 | End: 2020-05-13

## 2020-04-05 ENCOUNTER — OFFICE VISIT (OUTPATIENT)
Dept: FAMILY MEDICINE CLINIC | Facility: CLINIC | Age: 65
End: 2020-04-05
Payer: COMMERCIAL

## 2020-04-05 VITALS
HEIGHT: 68 IN | HEART RATE: 62 BPM | OXYGEN SATURATION: 98 % | TEMPERATURE: 98 F | BODY MASS INDEX: 26.98 KG/M2 | WEIGHT: 178 LBS

## 2020-04-05 DIAGNOSIS — H65.02 ACUTE SEROUS OTITIS MEDIA OF LEFT EAR, RECURRENCE NOT SPECIFIED: Primary | ICD-10-CM

## 2020-04-05 PROCEDURE — 99213 OFFICE O/P EST LOW 20 MIN: CPT | Performed by: NURSE PRACTITIONER

## 2020-04-05 RX ORDER — AMOXICILLIN 875 MG/1
875 TABLET, COATED ORAL 2 TIMES DAILY
Qty: 20 TABLET | Refills: 0 | Status: SHIPPED | OUTPATIENT
Start: 2020-04-05 | End: 2020-04-15

## 2020-04-05 NOTE — PROGRESS NOTES
CHIEF COMPLAINT:   Patient presents with:  Ear Pain      HPI:   Christa Robles is a 63year old male who presents to clinic today with complaints of left ear pain. Has had for 6  weeks. Pain is described as intermittent.   Patient denies history of EARS: bilat tragus non tender with manipulation. External auditory canals clear. Right TM: grey, no bulging, no retraction, + effusion, bony landmarks partially obscured.   Left TM: + yellow/grey, no bulging, no retraction, + effusion, bony landmarks obscu You have an infection of the middle ear, the space behind the eardrum. This is also called acute otitis media (AOM). Sometimes it is caused by the common cold.  This is because congestion can block the internal passage (eustachian tube) that drains fluid fr

## 2020-05-13 DIAGNOSIS — F33.1 MODERATE EPISODE OF RECURRENT MAJOR DEPRESSIVE DISORDER (HCC): ICD-10-CM

## 2020-05-15 RX ORDER — ESCITALOPRAM OXALATE 10 MG/1
10 TABLET ORAL DAILY
Qty: 30 TABLET | Refills: 0 | Status: SHIPPED | OUTPATIENT
Start: 2020-05-15 | End: 2020-05-22

## 2020-05-18 NOTE — TELEPHONE ENCOUNTER
Patient called back informed him medication was approved for 30 days and sent to his pharmacy and that he is due for a medication follow up.  Patient said he will schedule on my-chart

## 2020-05-22 DIAGNOSIS — F33.1 MODERATE EPISODE OF RECURRENT MAJOR DEPRESSIVE DISORDER (HCC): ICD-10-CM

## 2020-05-24 RX ORDER — ESCITALOPRAM OXALATE 10 MG/1
10 TABLET ORAL DAILY
Qty: 15 TABLET | Refills: 0 | Status: SHIPPED | OUTPATIENT
Start: 2020-05-24 | End: 2020-06-02

## 2020-06-02 NOTE — PROGRESS NOTES
Chief Complaint:   Patient presents with:  Medication Follow-Up    HPI:   This is a 63year old male         DEPRESSION    CUrrently on Lexapro 10 mg. Feels well. No anxiety. Mood is good. Feels at goal. Wifes sees difference.   No SI no HI  No Hallucinat Problem List:     Vegan diet     Sx: Tailor's bunionectomy right foot.  (Sx 08/15/16; GEP 11/13/16)     Hypertension     Hyperlipidemia     Anxiety     Sprain of ankle     Encounter for screening for malignant neoplasm of prostate     Family history of hear annual physical.  - escitalopram 10 MG Oral Tab; Take 1 tablet (10 mg total) by mouth daily. Dispense: 90 tablet; Refill: 0    2. Laboratory examination ordered as part of a routine general medical examination  - CBC WITH DIFFERENTIAL WITH PLATELET;  Futur

## 2020-07-09 ENCOUNTER — PATIENT OUTREACH (OUTPATIENT)
Dept: FAMILY MEDICINE CLINIC | Facility: CLINIC | Age: 65
End: 2020-07-09

## 2020-07-29 ENCOUNTER — PATIENT MESSAGE (OUTPATIENT)
Dept: FAMILY MEDICINE CLINIC | Facility: CLINIC | Age: 65
End: 2020-07-29

## 2020-07-30 NOTE — TELEPHONE ENCOUNTER
From: Christa Robles  To: Juanita Huffman MD  Sent: 7/29/2020 1:10 PM CDT  Subject: Test Results Question    I have a physical scheduled for 8/31/20.  Should I come in a couple weeks before physical to have my blood drawn for the required test relat

## 2020-08-21 ENCOUNTER — LAB ENCOUNTER (OUTPATIENT)
Dept: LAB | Age: 65
End: 2020-08-21
Attending: EMERGENCY MEDICINE
Payer: COMMERCIAL

## 2020-08-21 DIAGNOSIS — Z00.00 LABORATORY EXAMINATION ORDERED AS PART OF A ROUTINE GENERAL MEDICAL EXAMINATION: ICD-10-CM

## 2020-08-21 DIAGNOSIS — E78.2 MIXED HYPERLIPIDEMIA: ICD-10-CM

## 2020-08-21 DIAGNOSIS — Z12.5 SCREENING PSA (PROSTATE SPECIFIC ANTIGEN): ICD-10-CM

## 2020-08-21 DIAGNOSIS — I10 ESSENTIAL HYPERTENSION: ICD-10-CM

## 2020-08-21 LAB
ALBUMIN SERPL-MCNC: 3.7 G/DL (ref 3.4–5)
ALBUMIN/GLOB SERPL: 1.2 {RATIO} (ref 1–2)
ALP LIVER SERPL-CCNC: 75 U/L (ref 45–117)
ALT SERPL-CCNC: 22 U/L (ref 16–61)
ANION GAP SERPL CALC-SCNC: 5 MMOL/L (ref 0–18)
AST SERPL-CCNC: 27 U/L (ref 15–37)
BASOPHILS # BLD AUTO: 0.04 X10(3) UL (ref 0–0.2)
BASOPHILS NFR BLD AUTO: 0.7 %
BILIRUB SERPL-MCNC: 0.6 MG/DL (ref 0.1–2)
BUN BLD-MCNC: 8 MG/DL (ref 7–18)
BUN/CREAT SERPL: 8.8 (ref 10–20)
CALCIUM BLD-MCNC: 8.9 MG/DL (ref 8.5–10.1)
CHLORIDE SERPL-SCNC: 106 MMOL/L (ref 98–112)
CHOLEST SMN-MCNC: 180 MG/DL (ref ?–200)
CO2 SERPL-SCNC: 25 MMOL/L (ref 21–32)
COMPLEXED PSA SERPL-MCNC: 2.71 NG/ML (ref ?–4)
CREAT BLD-MCNC: 0.91 MG/DL (ref 0.7–1.3)
DEPRECATED RDW RBC AUTO: 42.3 FL (ref 35.1–46.3)
EOSINOPHIL # BLD AUTO: 0.08 X10(3) UL (ref 0–0.7)
EOSINOPHIL NFR BLD AUTO: 1.5 %
ERYTHROCYTE [DISTWIDTH] IN BLOOD BY AUTOMATED COUNT: 12.8 % (ref 11–15)
GLOBULIN PLAS-MCNC: 3.1 G/DL (ref 2.8–4.4)
GLUCOSE BLD-MCNC: 86 MG/DL (ref 70–99)
HCT VFR BLD AUTO: 42.8 % (ref 39–53)
HDLC SERPL-MCNC: 61 MG/DL (ref 40–59)
HGB BLD-MCNC: 14.7 G/DL (ref 13–17.5)
IMM GRANULOCYTES # BLD AUTO: 0.01 X10(3) UL (ref 0–1)
IMM GRANULOCYTES NFR BLD: 0.2 %
LDLC SERPL CALC-MCNC: 111 MG/DL (ref ?–100)
LYMPHOCYTES # BLD AUTO: 1.38 X10(3) UL (ref 1–4)
LYMPHOCYTES NFR BLD AUTO: 25.8 %
M PROTEIN MFR SERPL ELPH: 6.8 G/DL (ref 6.4–8.2)
MCH RBC QN AUTO: 30.9 PG (ref 26–34)
MCHC RBC AUTO-ENTMCNC: 34.3 G/DL (ref 31–37)
MCV RBC AUTO: 90.1 FL (ref 80–100)
MONOCYTES # BLD AUTO: 0.68 X10(3) UL (ref 0.1–1)
MONOCYTES NFR BLD AUTO: 12.7 %
NEUTROPHILS # BLD AUTO: 3.16 X10 (3) UL (ref 1.5–7.7)
NEUTROPHILS # BLD AUTO: 3.16 X10(3) UL (ref 1.5–7.7)
NEUTROPHILS NFR BLD AUTO: 59.1 %
NONHDLC SERPL-MCNC: 119 MG/DL (ref ?–130)
OSMOLALITY SERPL CALC.SUM OF ELEC: 280 MOSM/KG (ref 275–295)
PATIENT FASTING Y/N/NP: YES
PATIENT FASTING Y/N/NP: YES
PLATELET # BLD AUTO: 190 10(3)UL (ref 150–450)
POTASSIUM SERPL-SCNC: 3.9 MMOL/L (ref 3.5–5.1)
RBC # BLD AUTO: 4.75 X10(6)UL (ref 4.3–5.7)
SODIUM SERPL-SCNC: 136 MMOL/L (ref 136–145)
TRIGL SERPL-MCNC: 42 MG/DL (ref 30–149)
TSI SER-ACNC: 1.81 MIU/ML (ref 0.36–3.74)
VLDLC SERPL CALC-MCNC: 8 MG/DL (ref 0–30)
WBC # BLD AUTO: 5.4 X10(3) UL (ref 4–11)

## 2020-08-21 PROCEDURE — 80050 GENERAL HEALTH PANEL: CPT | Performed by: EMERGENCY MEDICINE

## 2020-08-21 PROCEDURE — 36415 COLL VENOUS BLD VENIPUNCTURE: CPT | Performed by: EMERGENCY MEDICINE

## 2020-08-21 PROCEDURE — G0103 PSA SCREENING: HCPCS | Performed by: EMERGENCY MEDICINE

## 2020-08-24 ENCOUNTER — PATIENT MESSAGE (OUTPATIENT)
Dept: FAMILY MEDICINE CLINIC | Facility: CLINIC | Age: 65
End: 2020-08-24

## 2020-08-27 ENCOUNTER — TELEPHONE (OUTPATIENT)
Dept: FAMILY MEDICINE CLINIC | Facility: CLINIC | Age: 65
End: 2020-08-27

## 2020-08-27 DIAGNOSIS — F33.1 MODERATE EPISODE OF RECURRENT MAJOR DEPRESSIVE DISORDER (HCC): ICD-10-CM

## 2020-08-27 RX ORDER — ESCITALOPRAM OXALATE 10 MG/1
TABLET ORAL
Qty: 90 TABLET | Refills: 0 | Status: SHIPPED | OUTPATIENT
Start: 2020-08-27 | End: 2020-12-14

## 2020-08-31 ENCOUNTER — OFFICE VISIT (OUTPATIENT)
Dept: FAMILY MEDICINE CLINIC | Facility: CLINIC | Age: 65
End: 2020-08-31
Payer: COMMERCIAL

## 2020-08-31 VITALS
HEART RATE: 70 BPM | RESPIRATION RATE: 17 BRPM | DIASTOLIC BLOOD PRESSURE: 78 MMHG | WEIGHT: 183 LBS | BODY MASS INDEX: 27.74 KG/M2 | OXYGEN SATURATION: 96 % | SYSTOLIC BLOOD PRESSURE: 120 MMHG | TEMPERATURE: 98 F | HEIGHT: 68 IN

## 2020-08-31 DIAGNOSIS — Z12.11 SCREENING FOR COLON CANCER: ICD-10-CM

## 2020-08-31 DIAGNOSIS — F33.1 MODERATE EPISODE OF RECURRENT MAJOR DEPRESSIVE DISORDER (HCC): ICD-10-CM

## 2020-08-31 DIAGNOSIS — Z82.49 FAMILY HISTORY OF HEART DISEASE IN MALE FAMILY MEMBER BEFORE AGE 55: ICD-10-CM

## 2020-08-31 DIAGNOSIS — Z28.21 REFUSES TETANUS, DIPHTHERIA, AND ACELLULAR PERTUSSIS (TDAP) VACCINATION: ICD-10-CM

## 2020-08-31 DIAGNOSIS — Z00.00 ANNUAL PHYSICAL EXAM: Primary | ICD-10-CM

## 2020-08-31 PROCEDURE — 99396 PREV VISIT EST AGE 40-64: CPT | Performed by: EMERGENCY MEDICINE

## 2020-08-31 PROCEDURE — 3008F BODY MASS INDEX DOCD: CPT | Performed by: EMERGENCY MEDICINE

## 2020-08-31 PROCEDURE — 3078F DIAST BP <80 MM HG: CPT | Performed by: EMERGENCY MEDICINE

## 2020-08-31 PROCEDURE — 3074F SYST BP LT 130 MM HG: CPT | Performed by: EMERGENCY MEDICINE

## 2020-08-31 NOTE — PATIENT INSTRUCTIONS
Thank you for choosing [facility]  To Do:  FOR WILLIS ELLIS      1. Complete stool cards for colon cancer screening  2. Follow up in 6 months for depression and and anxiety  3. Contionue with LExparo  4. Consider tetanus vaccine  5.  Flu s All adults beginning at age 39 and adults without symptoms at any age who are overweight or obese and have 1 or more other risk factors for diabetes At least every 3 years (yearly if your blood sugar has already begun to rise)   Hepatitis C Men at increase healthcare provider 1 to 3 doses   Influenza (flu) All men in this age group Once a year   Measles, mumps, rubella (MMR) Men in this age group through their late 46s who have no record of these infections or vaccines 1 or 2 doses; ask your healthcare provi

## 2020-08-31 NOTE — PROGRESS NOTES
Chief Complaint:   Patient presents with:  Physical: Annual physical     HPI:   This is a 63year old male who present for a yearly annual exam    WELL-MALE EXAM       1. Age:              63   2.   Have you had any of the following problems:          a. Laterality Date   • HIP REPLACEMENT SURGERY Left 04/17/2019   • OTHER SURGICAL HISTORY Right     foot surgery, bunion removal    • OTHER SURGICAL HISTORY Left 04/17/2019    hip replacement   • REMOVAL OF KIDNEY STONE Left 2010   • TAILOR'S BUNIONECTOMY Rig well hydrated, no distress  SKIN: good skin turgor, no obvious rashes  HEENT: atraumatic, normocephalic, ears, nose and throat are clear  EYES: sclera non icteric bilateral  NECK: supple, no adenopathy, no thyromegaly  LUNGS: clear to auscultation, no RRW Range    Prostate Specific Antigen Screen 2.71 <=4.00 ng/mL   CBC W/ DIFFERENTIAL    Collection Time: 08/21/20 11:13 AM   Result Value Ref Range    WBC 5.4 4.0 - 11.0 x10(3) uL    RBC 4.75 4.30 - 5.70 x10(6)uL    HGB 14.7 13.0 - 17.5 g/dL    HCT 42.8 39.0 glasses/contacts. Yearly Flu Vaccine recommended. Counseled on fat diet and aerobic exercise 30 minutes three times weekly.    Counseled on maintaining a healthy weight and healthy BMI  Immunizations reviewed and updated  TDAP, REFUSED at this trime  The

## 2020-12-14 ENCOUNTER — PATIENT MESSAGE (OUTPATIENT)
Dept: FAMILY MEDICINE CLINIC | Facility: CLINIC | Age: 65
End: 2020-12-14

## 2020-12-14 DIAGNOSIS — F33.1 MODERATE EPISODE OF RECURRENT MAJOR DEPRESSIVE DISORDER (HCC): ICD-10-CM

## 2020-12-14 NOTE — TELEPHONE ENCOUNTER
Medication(s) to Refill:   Requested Prescriptions     Pending Prescriptions Disp Refills   • escitalopram 10 MG Oral Tab 90 tablet 0     Sig: Take 1 tablet (10 mg total) by mouth daily.          Reason for Medication Refill being sent to Provider / Hong Pink

## 2020-12-14 NOTE — TELEPHONE ENCOUNTER
From: Darian Aj  To: Yvette Thomson MD  Sent: 12/14/2020 8:34 AM CST  Subject: Prescription Question    I need a prescription refill for Escitalopram 10 MG.

## 2020-12-15 RX ORDER — ESCITALOPRAM OXALATE 10 MG/1
10 TABLET ORAL DAILY
Qty: 90 TABLET | Refills: 0 | Status: SHIPPED | OUTPATIENT
Start: 2020-12-15 | End: 2021-03-11

## 2021-03-08 DIAGNOSIS — F33.1 MODERATE EPISODE OF RECURRENT MAJOR DEPRESSIVE DISORDER (HCC): ICD-10-CM

## 2021-03-11 RX ORDER — ESCITALOPRAM OXALATE 10 MG/1
10 TABLET ORAL DAILY
Qty: 30 TABLET | Refills: 0 | Status: SHIPPED | OUTPATIENT
Start: 2021-03-11 | End: 2021-04-03

## 2021-04-02 DIAGNOSIS — F33.1 MODERATE EPISODE OF RECURRENT MAJOR DEPRESSIVE DISORDER (HCC): ICD-10-CM

## 2021-04-03 RX ORDER — ESCITALOPRAM OXALATE 10 MG/1
10 TABLET ORAL DAILY
Qty: 30 TABLET | Refills: 0 | Status: SHIPPED | OUTPATIENT
Start: 2021-04-03 | End: 2021-05-04

## 2021-04-03 NOTE — TELEPHONE ENCOUNTER
Medication(s) to Refill:   Requested Prescriptions     Pending Prescriptions Disp Refills   • ESCITALOPRAM 10 MG Oral Tab [Pharmacy Med Name: ESCITALOPRAM 10 MG TABLET] 30 tablet 0     Sig: TAKE 1 TABLET BY MOUTH EVERY DAY         Reason for Medication Ref

## 2021-04-03 NOTE — TELEPHONE ENCOUNTER
lexapro refilled 30 d  Needs office visit before next refill  Pt was given 30 d refill previously  pls call and assist with scheduling due for 6 months follow up, Thanks!!

## 2021-04-30 DIAGNOSIS — F33.1 MODERATE EPISODE OF RECURRENT MAJOR DEPRESSIVE DISORDER (HCC): ICD-10-CM

## 2021-04-30 RX ORDER — ESCITALOPRAM OXALATE 10 MG/1
10 TABLET ORAL DAILY
Qty: 30 TABLET | Refills: 0 | OUTPATIENT
Start: 2021-04-30

## 2021-05-04 ENCOUNTER — PATIENT MESSAGE (OUTPATIENT)
Dept: FAMILY MEDICINE CLINIC | Facility: CLINIC | Age: 66
End: 2021-05-04

## 2021-05-04 DIAGNOSIS — F33.1 MODERATE EPISODE OF RECURRENT MAJOR DEPRESSIVE DISORDER (HCC): ICD-10-CM

## 2021-05-04 NOTE — TELEPHONE ENCOUNTER
Pt made OV for 5/14/21, please refill. Patient needs a 90day supply to be covered by his insurance. Thank you.

## 2021-05-04 NOTE — TELEPHONE ENCOUNTER
From: Ophelia Gillespie  To: Carmen Parikh MD  Sent: 5/4/2021 3:41 PM CDT  Subject: Prescription Question    I would like to stay on Escitalopram for now. I feel that it has helped me. Do I need to make an appointment to receive any refills?  Also if

## 2021-05-04 NOTE — TELEPHONE ENCOUNTER
Pt sent message requesting for medication refill Escitalopram Oxalate 10 MG Oral Tablet (LEXAPRO). Pt scheduled appt for 5/14. Pt requesting for a 90 day supply d/t insurance purposes. Please approve or deny pending Rx. Thank you.    ODO:3/34/8813

## 2021-05-04 NOTE — TELEPHONE ENCOUNTER
Per refill message dated 4/30/2021, patient needs OV. Left message for patient stating that he is due for OV. MyChart message sent to patient.

## 2021-05-06 RX ORDER — ESCITALOPRAM OXALATE 10 MG/1
10 TABLET ORAL DAILY
Qty: 90 TABLET | Refills: 0 | Status: SHIPPED | OUTPATIENT
Start: 2021-05-06 | End: 2021-07-30

## 2021-05-14 ENCOUNTER — OFFICE VISIT (OUTPATIENT)
Dept: FAMILY MEDICINE CLINIC | Facility: CLINIC | Age: 66
End: 2021-05-14
Payer: COMMERCIAL

## 2021-05-14 VITALS
DIASTOLIC BLOOD PRESSURE: 88 MMHG | HEART RATE: 67 BPM | OXYGEN SATURATION: 96 % | SYSTOLIC BLOOD PRESSURE: 150 MMHG | RESPIRATION RATE: 15 BRPM | WEIGHT: 187 LBS | BODY MASS INDEX: 28 KG/M2 | TEMPERATURE: 98 F

## 2021-05-14 DIAGNOSIS — F33.1 MODERATE EPISODE OF RECURRENT MAJOR DEPRESSIVE DISORDER (HCC): ICD-10-CM

## 2021-05-14 PROCEDURE — 3079F DIAST BP 80-89 MM HG: CPT | Performed by: EMERGENCY MEDICINE

## 2021-05-14 PROCEDURE — 3077F SYST BP >= 140 MM HG: CPT | Performed by: EMERGENCY MEDICINE

## 2021-05-14 PROCEDURE — 99214 OFFICE O/P EST MOD 30 MIN: CPT | Performed by: EMERGENCY MEDICINE

## 2021-05-14 NOTE — PATIENT INSTRUCTIONS
Thank you for choosing [address] [place] [address] [facility]  To Do:  FOR WILLIS ELLIS        1. Ok to continue with lexapro  2. Follow up with psychiatry for possible Bipolar D/O  3. Follow up after August 31 for annual physical  4.                  Larry Machuca,  or . · Be patient. Recovering from depression is a process. Don’t be discouraged if it takes some time to feel better. · Keep it simple. Depression saps your energy and concentration.  So you won’t be able to do all the things you used to more serious than just feeling down. Depression is a real illness, just like diabetes or heart disease.  And just like those illnesses, depression is not something a person can \"snap out of.\" It's believed that a combination of genetic, biological, enviro she can offer guidance and support. What causes bipolar disorder? The exact causes of bipolar disorder aren’t known. It is known that the disease runs in families.  Genes that affect nerve cells in the brain may be inherited, but as yet these genes have your problems  · Feeling a pressing need to make out your will or arrange your   · Giving away things you own  · Participating in risky behaviors, such as sex with someone you don't know or drinking and driving  · Buying a lethal weapon, such as a g radha vary with each person. It can be weeks, month, or even years. · In a manic episode, you may think fast and do things quickly. It may seem like you are getting a lot done. At first, it may feel very good.  But in the extreme, radha can lead to a life healthcare provider says it is OK to do so. · Tell all your healthcare providers about all the prescriptions, over-the-counter medicines, and supplements you take. Certain supplements interact with medicines. They may cause dangerous side effects.  You can of hurting someone else  · Trouble breathing  · Confusion  · Drowsiness or trouble wakening  · Fainting or loss of consciousness  · Rapid heart rate, very low heart rate, or a new irregular heart rate  · Seizure  · New chest pain that becomes more severe,

## 2021-05-14 NOTE — PROGRESS NOTES
Chief Complaint:   Patient presents with:  Depression: Med f/u     HPI:   This is a 61year old male         DEPRESSION    On lexapro, mood is good activity level good. No new symptoms. Denies any homicidal suicidal ideations. No hallucinations.   When PALMETTO, SERENOA REPENS, OR, Take  by mouth., Disp: , Rfl:   ASTAXANTHIN OR, Take 4 mg by mouth daily. , Disp: , Rfl:     No current facility-administered medications on file prior to visit.      Counseling given: Not Answered         PROBLEM LIST     Farideh Morgan for depression is suspicious for possible manic episodes. Cannot rule out underlying bipolar disorder. Will refer to psychiatry. Resources provided. Okay to continue with Lexapro for now.   Follow-up in office for annual physical in August.  Encouraged

## 2021-07-30 DIAGNOSIS — F33.1 MODERATE EPISODE OF RECURRENT MAJOR DEPRESSIVE DISORDER (HCC): ICD-10-CM

## 2021-07-30 RX ORDER — ESCITALOPRAM OXALATE 10 MG/1
TABLET ORAL
Qty: 90 TABLET | Refills: 0 | Status: SHIPPED | OUTPATIENT
Start: 2021-07-30 | End: 2021-09-09

## 2021-09-01 ENCOUNTER — LAB ENCOUNTER (OUTPATIENT)
Dept: LAB | Age: 66
End: 2021-09-01
Attending: EMERGENCY MEDICINE
Payer: COMMERCIAL

## 2021-09-01 DIAGNOSIS — E78.2 MIXED HYPERLIPIDEMIA: ICD-10-CM

## 2021-09-01 DIAGNOSIS — Z00.00 LABORATORY EXAMINATION ORDERED AS PART OF A ROUTINE GENERAL MEDICAL EXAMINATION: ICD-10-CM

## 2021-09-01 DIAGNOSIS — Z12.5 ENCOUNTER FOR PROSTATE CANCER SCREENING: ICD-10-CM

## 2021-09-01 LAB
ALBUMIN SERPL-MCNC: 3.7 G/DL (ref 3.4–5)
ALBUMIN/GLOB SERPL: 1.3 {RATIO} (ref 1–2)
ALP LIVER SERPL-CCNC: 71 U/L
ALT SERPL-CCNC: 25 U/L
ANION GAP SERPL CALC-SCNC: 7 MMOL/L (ref 0–18)
AST SERPL-CCNC: 19 U/L (ref 15–37)
BASOPHILS # BLD AUTO: 0.06 X10(3) UL (ref 0–0.2)
BASOPHILS NFR BLD AUTO: 1.2 %
BILIRUB SERPL-MCNC: 0.6 MG/DL (ref 0.1–2)
BILIRUB UR QL STRIP.AUTO: NEGATIVE
BUN BLD-MCNC: 10 MG/DL (ref 7–18)
CALCIUM BLD-MCNC: 8.5 MG/DL (ref 8.5–10.1)
CHLORIDE SERPL-SCNC: 107 MMOL/L (ref 98–112)
CHOLEST SMN-MCNC: 173 MG/DL (ref ?–200)
CLARITY UR REFRACT.AUTO: CLEAR
CO2 SERPL-SCNC: 25 MMOL/L (ref 21–32)
COLOR UR AUTO: YELLOW
COMPLEXED PSA SERPL-MCNC: 2.75 NG/ML (ref ?–4)
CREAT BLD-MCNC: 0.84 MG/DL
EOSINOPHIL # BLD AUTO: 0.09 X10(3) UL (ref 0–0.7)
EOSINOPHIL NFR BLD AUTO: 1.8 %
ERYTHROCYTE [DISTWIDTH] IN BLOOD BY AUTOMATED COUNT: 13.2 %
GLOBULIN PLAS-MCNC: 2.8 G/DL (ref 2.8–4.4)
GLUCOSE BLD-MCNC: 103 MG/DL (ref 70–99)
GLUCOSE UR STRIP.AUTO-MCNC: NEGATIVE MG/DL
HCT VFR BLD AUTO: 42.8 %
HDLC SERPL-MCNC: 53 MG/DL (ref 40–59)
HGB BLD-MCNC: 14.5 G/DL
IMM GRANULOCYTES # BLD AUTO: 0.01 X10(3) UL (ref 0–1)
IMM GRANULOCYTES NFR BLD: 0.2 %
KETONES UR STRIP.AUTO-MCNC: NEGATIVE MG/DL
LDLC SERPL CALC-MCNC: 110 MG/DL (ref ?–100)
LEUKOCYTE ESTERASE UR QL STRIP.AUTO: NEGATIVE
LYMPHOCYTES # BLD AUTO: 1.15 X10(3) UL (ref 1–4)
LYMPHOCYTES NFR BLD AUTO: 23.4 %
M PROTEIN MFR SERPL ELPH: 6.5 G/DL (ref 6.4–8.2)
MCH RBC QN AUTO: 31 PG (ref 26–34)
MCHC RBC AUTO-ENTMCNC: 33.9 G/DL (ref 31–37)
MCV RBC AUTO: 91.5 FL
MONOCYTES # BLD AUTO: 0.67 X10(3) UL (ref 0.1–1)
MONOCYTES NFR BLD AUTO: 13.6 %
NEUTROPHILS # BLD AUTO: 2.94 X10 (3) UL (ref 1.5–7.7)
NEUTROPHILS # BLD AUTO: 2.94 X10(3) UL (ref 1.5–7.7)
NEUTROPHILS NFR BLD AUTO: 59.8 %
NITRITE UR QL STRIP.AUTO: NEGATIVE
NONHDLC SERPL-MCNC: 120 MG/DL (ref ?–130)
OSMOLALITY SERPL CALC.SUM OF ELEC: 287 MOSM/KG (ref 275–295)
PATIENT FASTING Y/N/NP: YES
PATIENT FASTING Y/N/NP: YES
PH UR STRIP.AUTO: 7 [PH] (ref 5–8)
PLATELET # BLD AUTO: 186 10(3)UL (ref 150–450)
POTASSIUM SERPL-SCNC: 3.9 MMOL/L (ref 3.5–5.1)
PROT UR STRIP.AUTO-MCNC: NEGATIVE MG/DL
RBC # BLD AUTO: 4.68 X10(6)UL
RBC UR QL AUTO: NEGATIVE
SODIUM SERPL-SCNC: 139 MMOL/L (ref 136–145)
SP GR UR STRIP.AUTO: 1.01 (ref 1–1.03)
TRIGL SERPL-MCNC: 51 MG/DL (ref 30–149)
TSI SER-ACNC: 1.62 MIU/ML (ref 0.36–3.74)
UROBILINOGEN UR STRIP.AUTO-MCNC: <2 MG/DL
VLDLC SERPL CALC-MCNC: 9 MG/DL (ref 0–30)
WBC # BLD AUTO: 4.9 X10(3) UL (ref 4–11)

## 2021-09-01 PROCEDURE — 84153 ASSAY OF PSA TOTAL: CPT | Performed by: EMERGENCY MEDICINE

## 2021-09-01 PROCEDURE — 81001 URINALYSIS AUTO W/SCOPE: CPT | Performed by: EMERGENCY MEDICINE

## 2021-09-01 PROCEDURE — 80050 GENERAL HEALTH PANEL: CPT | Performed by: EMERGENCY MEDICINE

## 2021-09-09 ENCOUNTER — OFFICE VISIT (OUTPATIENT)
Dept: FAMILY MEDICINE CLINIC | Facility: CLINIC | Age: 66
End: 2021-09-09
Payer: COMMERCIAL

## 2021-09-09 VITALS
DIASTOLIC BLOOD PRESSURE: 88 MMHG | WEIGHT: 184 LBS | BODY MASS INDEX: 27.89 KG/M2 | OXYGEN SATURATION: 97 % | HEART RATE: 66 BPM | SYSTOLIC BLOOD PRESSURE: 130 MMHG | HEIGHT: 68 IN | RESPIRATION RATE: 15 BRPM

## 2021-09-09 DIAGNOSIS — Z00.00 ENCOUNTER FOR ANNUAL PHYSICAL EXAM: Primary | ICD-10-CM

## 2021-09-09 DIAGNOSIS — F33.1 MODERATE EPISODE OF RECURRENT MAJOR DEPRESSIVE DISORDER (HCC): ICD-10-CM

## 2021-09-09 DIAGNOSIS — Z12.11 SCREENING FOR COLON CANCER: ICD-10-CM

## 2021-09-09 PROCEDURE — 3075F SYST BP GE 130 - 139MM HG: CPT | Performed by: EMERGENCY MEDICINE

## 2021-09-09 PROCEDURE — 3079F DIAST BP 80-89 MM HG: CPT | Performed by: EMERGENCY MEDICINE

## 2021-09-09 PROCEDURE — 3008F BODY MASS INDEX DOCD: CPT | Performed by: EMERGENCY MEDICINE

## 2021-09-09 PROCEDURE — 99396 PREV VISIT EST AGE 40-64: CPT | Performed by: EMERGENCY MEDICINE

## 2021-09-09 RX ORDER — ESCITALOPRAM OXALATE 10 MG/1
10 TABLET ORAL DAILY
Qty: 90 TABLET | Refills: 0 | Status: SHIPPED | OUTPATIENT
Start: 2021-09-09

## 2021-09-09 NOTE — PATIENT INSTRUCTIONS
Thank you for choosing [facility]  To Do:  FOR WILLIS ELLIS        1. FOllow up with psychiatry for further evaluation  2. COntinue with all medications  3. Flu shot in the fall. 4. Recommend COVID vaccine  5.  Complete stool cards for co every 10 years, or double-contrast barium enema every 5 years; yearly fecal occult blood test or fecal immunochemical test; or a stool DNA test as often as your healthcare provider advises; talk with your healthcare provider about which tests are best for increased risk for infection – talk with your healthcare provider 3 doses over 6 months; second dose should be given 1 month after the first dose; the third dose should be given at least 2 months after the second dose and at least 4 months after the first information is not intended as a substitute for professional medical care. Always follow your healthcare professional's instructions.

## 2021-09-09 NOTE — PROGRESS NOTES
Chief Complaint:   Patient presents with:  Physical: Annual physical     HPI:   This is a 61year old male who present for a yearly annual exam    WELL-MALE EXAM         1. Age:              61   2.   Have you had any of the following problems:   Diagnosis Date   • Dyslipidemia    • Unspecified essential hypertension      Past Surgical History:   Procedure Laterality Date   • HIP REPLACEMENT SURGERY Left 04/17/2019   • OTHER SURGICAL HISTORY Right     foot surgery, bunion removal    • OTHER SURGI clear  EYES: sclera non icteric bilateral  NECK: supple, no adenopathy, no thyromegaly  LUNGS: clear to auscultation, no RRW  CARDIO: RRR without murmur  EXTREMITIES: no cyanosis, clubbing or edema  GI:soft Nontender Normoactive BS.   No palpable masses no appointments with dentist.  Schedule yearly eye exam if you wear glasses/contacts. Yearly Flu Vaccine recommended. Counseled on fat diet and aerobic exercise 30 minutes three times weekly.    Counseled on maintaining a healthy weight and healthy BMI  Immu

## 2021-10-23 DIAGNOSIS — F33.1 MODERATE EPISODE OF RECURRENT MAJOR DEPRESSIVE DISORDER (HCC): ICD-10-CM

## 2021-10-25 RX ORDER — ESCITALOPRAM OXALATE 10 MG/1
TABLET ORAL
Qty: 90 TABLET | Refills: 0 | OUTPATIENT
Start: 2021-10-25

## 2022-02-10 RX ORDER — ESCITALOPRAM OXALATE 10 MG/1
10 TABLET ORAL DAILY
Qty: 90 TABLET | Refills: 0 | Status: SHIPPED | OUTPATIENT
Start: 2022-02-10

## 2022-04-22 ENCOUNTER — MED REC SCAN ONLY (OUTPATIENT)
Dept: FAMILY MEDICINE CLINIC | Facility: CLINIC | Age: 67
End: 2022-04-22

## 2022-05-12 RX ORDER — ESCITALOPRAM OXALATE 10 MG/1
10 TABLET ORAL DAILY
Qty: 90 TABLET | Refills: 0 | OUTPATIENT
Start: 2022-05-12

## 2022-05-24 ENCOUNTER — OFFICE VISIT (OUTPATIENT)
Dept: FAMILY MEDICINE CLINIC | Facility: CLINIC | Age: 67
End: 2022-05-24
Payer: COMMERCIAL

## 2022-05-24 VITALS
BODY MASS INDEX: 29 KG/M2 | OXYGEN SATURATION: 98 % | WEIGHT: 191 LBS | DIASTOLIC BLOOD PRESSURE: 86 MMHG | SYSTOLIC BLOOD PRESSURE: 130 MMHG | HEART RATE: 74 BPM | RESPIRATION RATE: 17 BRPM

## 2022-05-24 DIAGNOSIS — F33.1 MODERATE EPISODE OF RECURRENT MAJOR DEPRESSIVE DISORDER (HCC): ICD-10-CM

## 2022-05-24 PROCEDURE — 3075F SYST BP GE 130 - 139MM HG: CPT | Performed by: EMERGENCY MEDICINE

## 2022-05-24 PROCEDURE — 3079F DIAST BP 80-89 MM HG: CPT | Performed by: EMERGENCY MEDICINE

## 2022-05-24 PROCEDURE — 99214 OFFICE O/P EST MOD 30 MIN: CPT | Performed by: EMERGENCY MEDICINE

## 2022-05-24 RX ORDER — ESCITALOPRAM OXALATE 10 MG/1
10 TABLET ORAL DAILY
Qty: 90 TABLET | Refills: 0 | Status: SHIPPED | OUTPATIENT
Start: 2022-05-24

## 2022-07-20 ENCOUNTER — MED REC SCAN ONLY (OUTPATIENT)
Dept: FAMILY MEDICINE CLINIC | Facility: CLINIC | Age: 67
End: 2022-07-20

## 2022-08-10 ENCOUNTER — MED REC SCAN ONLY (OUTPATIENT)
Dept: FAMILY MEDICINE CLINIC | Facility: CLINIC | Age: 67
End: 2022-08-10

## 2022-08-21 DIAGNOSIS — F33.1 MODERATE EPISODE OF RECURRENT MAJOR DEPRESSIVE DISORDER (HCC): ICD-10-CM

## 2022-08-23 RX ORDER — ESCITALOPRAM OXALATE 10 MG/1
TABLET ORAL
Qty: 30 TABLET | Refills: 0 | Status: SHIPPED | OUTPATIENT
Start: 2022-08-23

## 2022-09-06 PROBLEM — F39 MOOD DISORDER (HCC): Status: ACTIVE | Noted: 2022-09-06

## 2022-09-06 PROBLEM — F42.2 MIXED OBSESSIONAL THOUGHTS AND ACTS: Status: ACTIVE | Noted: 2022-09-06

## 2022-09-12 ENCOUNTER — OFFICE VISIT (OUTPATIENT)
Dept: FAMILY MEDICINE CLINIC | Facility: CLINIC | Age: 67
End: 2022-09-12
Payer: COMMERCIAL

## 2022-09-12 VITALS
DIASTOLIC BLOOD PRESSURE: 88 MMHG | OXYGEN SATURATION: 98 % | RESPIRATION RATE: 17 BRPM | BODY MASS INDEX: 27.2 KG/M2 | SYSTOLIC BLOOD PRESSURE: 128 MMHG | WEIGHT: 179.5 LBS | HEIGHT: 68 IN | HEART RATE: 67 BPM

## 2022-09-12 DIAGNOSIS — Z28.21 REFUSED STREPTOCOCCUS PNEUMONIAE VACCINATION: ICD-10-CM

## 2022-09-12 DIAGNOSIS — Z28.21 REFUSES TETANUS, DIPHTHERIA, AND ACELLULAR PERTUSSIS (TDAP) VACCINATION: ICD-10-CM

## 2022-09-12 DIAGNOSIS — Z00.00 ENCOUNTER FOR ANNUAL PHYSICAL EXAM: Primary | ICD-10-CM

## 2022-09-12 DIAGNOSIS — Z12.11 SCREENING FOR COLON CANCER: ICD-10-CM

## 2022-09-12 DIAGNOSIS — Z12.5 ENCOUNTER FOR PROSTATE CANCER SCREENING: ICD-10-CM

## 2022-09-12 DIAGNOSIS — Z00.00 LABORATORY EXAMINATION ORDERED AS PART OF A ROUTINE GENERAL MEDICAL EXAMINATION: ICD-10-CM

## 2022-09-12 DIAGNOSIS — F39 MOOD DISORDER (HCC): ICD-10-CM

## 2022-09-12 PROCEDURE — 3008F BODY MASS INDEX DOCD: CPT | Performed by: EMERGENCY MEDICINE

## 2022-09-12 PROCEDURE — 99397 PER PM REEVAL EST PAT 65+ YR: CPT | Performed by: EMERGENCY MEDICINE

## 2022-09-12 PROCEDURE — 3079F DIAST BP 80-89 MM HG: CPT | Performed by: EMERGENCY MEDICINE

## 2022-09-12 PROCEDURE — 3074F SYST BP LT 130 MM HG: CPT | Performed by: EMERGENCY MEDICINE

## 2022-09-14 ENCOUNTER — LAB ENCOUNTER (OUTPATIENT)
Dept: LAB | Age: 67
End: 2022-09-14
Attending: EMERGENCY MEDICINE
Payer: COMMERCIAL

## 2022-09-14 DIAGNOSIS — Z00.00 LABORATORY EXAMINATION ORDERED AS PART OF A ROUTINE GENERAL MEDICAL EXAMINATION: ICD-10-CM

## 2022-09-14 DIAGNOSIS — Z12.5 ENCOUNTER FOR PROSTATE CANCER SCREENING: ICD-10-CM

## 2022-09-14 LAB
ALBUMIN SERPL-MCNC: 3.6 G/DL (ref 3.4–5)
ALBUMIN/GLOB SERPL: 1.1 {RATIO} (ref 1–2)
ALP LIVER SERPL-CCNC: 107 U/L
ALT SERPL-CCNC: 21 U/L
ANION GAP SERPL CALC-SCNC: 3 MMOL/L (ref 0–18)
AST SERPL-CCNC: 19 U/L (ref 15–37)
BASOPHILS # BLD AUTO: 0.04 X10(3) UL (ref 0–0.2)
BASOPHILS NFR BLD AUTO: 0.8 %
BILIRUB SERPL-MCNC: 0.6 MG/DL (ref 0.1–2)
BUN BLD-MCNC: 7 MG/DL (ref 7–18)
CALCIUM BLD-MCNC: 9.1 MG/DL (ref 8.5–10.1)
CHLORIDE SERPL-SCNC: 108 MMOL/L (ref 98–112)
CHOLEST SERPL-MCNC: 189 MG/DL (ref ?–200)
CO2 SERPL-SCNC: 26 MMOL/L (ref 21–32)
COMPLEXED PSA SERPL-MCNC: 3.71 NG/ML (ref ?–4)
CREAT BLD-MCNC: 0.88 MG/DL
EOSINOPHIL # BLD AUTO: 0.08 X10(3) UL (ref 0–0.7)
EOSINOPHIL NFR BLD AUTO: 1.6 %
ERYTHROCYTE [DISTWIDTH] IN BLOOD BY AUTOMATED COUNT: 14.2 %
FASTING PATIENT LIPID ANSWER: YES
FASTING STATUS PATIENT QL REPORTED: YES
GFR SERPLBLD BASED ON 1.73 SQ M-ARVRAT: 95 ML/MIN/1.73M2 (ref 60–?)
GLOBULIN PLAS-MCNC: 3.4 G/DL (ref 2.8–4.4)
GLUCOSE BLD-MCNC: 103 MG/DL (ref 70–99)
HCT VFR BLD AUTO: 43.8 %
HDLC SERPL-MCNC: 51 MG/DL (ref 40–59)
HGB BLD-MCNC: 14.1 G/DL
IMM GRANULOCYTES # BLD AUTO: 0.02 X10(3) UL (ref 0–1)
IMM GRANULOCYTES NFR BLD: 0.4 %
LDLC SERPL CALC-MCNC: 123 MG/DL (ref ?–100)
LYMPHOCYTES # BLD AUTO: 1 X10(3) UL (ref 1–4)
LYMPHOCYTES NFR BLD AUTO: 20.5 %
MCH RBC QN AUTO: 29.4 PG (ref 26–34)
MCHC RBC AUTO-ENTMCNC: 32.2 G/DL (ref 31–37)
MCV RBC AUTO: 91.4 FL
MONOCYTES # BLD AUTO: 0.53 X10(3) UL (ref 0.1–1)
MONOCYTES NFR BLD AUTO: 10.9 %
NEUTROPHILS # BLD AUTO: 3.2 X10 (3) UL (ref 1.5–7.7)
NEUTROPHILS # BLD AUTO: 3.2 X10(3) UL (ref 1.5–7.7)
NEUTROPHILS NFR BLD AUTO: 65.8 %
NONHDLC SERPL-MCNC: 138 MG/DL (ref ?–130)
OSMOLALITY SERPL CALC.SUM OF ELEC: 282 MOSM/KG (ref 275–295)
PLATELET # BLD AUTO: 206 10(3)UL (ref 150–450)
POTASSIUM SERPL-SCNC: 4.2 MMOL/L (ref 3.5–5.1)
PROT SERPL-MCNC: 7 G/DL (ref 6.4–8.2)
RBC # BLD AUTO: 4.79 X10(6)UL
SODIUM SERPL-SCNC: 137 MMOL/L (ref 136–145)
TRIGL SERPL-MCNC: 79 MG/DL (ref 30–149)
TSI SER-ACNC: 1.64 MIU/ML (ref 0.36–3.74)
VLDLC SERPL CALC-MCNC: 14 MG/DL (ref 0–30)
WBC # BLD AUTO: 4.9 X10(3) UL (ref 4–11)

## 2022-09-14 PROCEDURE — 80061 LIPID PANEL: CPT | Performed by: EMERGENCY MEDICINE

## 2022-09-14 PROCEDURE — 80050 GENERAL HEALTH PANEL: CPT | Performed by: EMERGENCY MEDICINE

## 2022-09-14 PROCEDURE — 84153 ASSAY OF PSA TOTAL: CPT | Performed by: EMERGENCY MEDICINE

## 2022-10-03 ENCOUNTER — APPOINTMENT (OUTPATIENT)
Dept: LAB | Age: 67
End: 2022-10-03
Attending: EMERGENCY MEDICINE
Payer: COMMERCIAL

## 2022-10-28 ENCOUNTER — MED REC SCAN ONLY (OUTPATIENT)
Dept: FAMILY MEDICINE CLINIC | Facility: CLINIC | Age: 67
End: 2022-10-28

## 2022-12-05 NOTE — PATIENT INSTRUCTIONS
Thank you for choosing [facility]  To Do:  FOR WILLIS ELLIS    · Follow up in 3 months for annual physical and recheck  · Complete STOOL CARDS for COlon cancer screening  · Contoinue with LExapro 10 mg   · Foolow up sooner if needed  · Ha small successes. · Take care of your physical body. Eat a balanced diet (low in saturated fat and high in fruits and vegetables). Exercise at least 3 times a week for 30 minutes. Even mild-moderate exercise (like brisk walking) can make you feel better. information is not intended as a substitute for professional medical care. Always follow your healthcare professional's instructions. What Can Cause Depression? Depression is a real, treatable illness. Certain factors can trigger it.  Below are some Women may get depressed when their hormone levels change quickly, such as just before their period, after giving birth, or during menopause. Parish last reviewed this educational content on 9/1/2019  © 8153-4004 [employer].  [facility] No

## 2023-09-19 ENCOUNTER — OFFICE VISIT (OUTPATIENT)
Dept: FAMILY MEDICINE CLINIC | Facility: CLINIC | Age: 68
End: 2023-09-19
Payer: MEDICARE

## 2023-09-19 VITALS
SYSTOLIC BLOOD PRESSURE: 150 MMHG | HEIGHT: 68 IN | HEART RATE: 53 BPM | DIASTOLIC BLOOD PRESSURE: 96 MMHG | BODY MASS INDEX: 27.24 KG/M2 | WEIGHT: 179.75 LBS | OXYGEN SATURATION: 99 % | RESPIRATION RATE: 12 BRPM

## 2023-09-19 DIAGNOSIS — Z00.00 WELCOME TO MEDICARE PREVENTIVE VISIT: Primary | ICD-10-CM

## 2023-09-19 DIAGNOSIS — F39 MOOD DISORDER (HCC): ICD-10-CM

## 2023-09-19 DIAGNOSIS — Z82.49 FAMILY HISTORY OF EARLY CAD: ICD-10-CM

## 2023-09-19 DIAGNOSIS — Z11.59 NEED FOR HEPATITIS C SCREENING TEST: ICD-10-CM

## 2023-09-19 DIAGNOSIS — Z12.5 SCREENING FOR PROSTATE CANCER: ICD-10-CM

## 2023-09-19 DIAGNOSIS — Z00.00 ENCOUNTER FOR ANNUAL HEALTH EXAMINATION: ICD-10-CM

## 2023-09-19 DIAGNOSIS — Z12.11 SCREENING FOR COLON CANCER: ICD-10-CM

## 2023-09-19 PROCEDURE — G0403 EKG FOR INITIAL PREVENT EXAM: HCPCS | Performed by: EMERGENCY MEDICINE

## 2023-09-19 PROCEDURE — G0402 INITIAL PREVENTIVE EXAM: HCPCS | Performed by: EMERGENCY MEDICINE

## 2023-09-19 NOTE — PATIENT INSTRUCTIONS
Thank you for choosing [facility]  To Do:  FOR WILLIS ALFARO    Have blood tests done  Follow up yearly or as needed  Recommend flu, shinghles and pheumonia vaccine  Complete cologuard test for colon cancer screening                      Sewell Rodney Alfaro's SCREENING SCHEDULE   Tests on this list are recommended by your physician but may not be covered, or covered at this frequency, by your insurer. Please check with your insurance carrier before scheduling to verify coverage.    PREVENTATIVE SERVICES FREQUENCY &  COVERAGE DETAILS LAST COMPLETION DATE   Diabetes Screening    Fasting Blood Sugar / Glucose    One screening every 12 months if never tested or if previously tested but not diagnosed with pre-diabetes   One screening every 6 months if diagnosed with pre-diabetes Lab Results   Component Value Date    GLUCOSE 89 09/20/2012     (H) 09/14/2022        Cardiovascular Disease Screening    Lipid Panel  Cholesterol  Lipoprotein (HDL)  Triglycerides Covered every 5 years for all Medicare beneficiaries without apparent signs or symptoms of cardiovascular disease Lab Results   Component Value Date    CHOLEST 189 09/14/2022    HDL 51 09/14/2022     (H) 09/14/2022    TRIG 79 09/14/2022         Electrocardiogram (EKG)   Covered if needed at Welcome to Medicare, and non-screening if indicated for medical reasons 03/11/2022      Ultrasound Screening for Abdominal Aortic Aneurysm (AAA) Covered once in a lifetime for one of the following risk factors    Men who are 73-68 years old and have ever smoked    Anyone with a family history -     Colorectal Cancer Screening  Covered for ages 52-80; only need ONE of the following:    Colonoscopy   Covered every 10 years    Covered every 2 years if patient is at high risk or previous colonoscopy was abnormal -    Colorectal Cancer Screening due on 10/03/2023    Flexible Sigmoidoscopy   Covered every 4 years -    Fecal Occult Blood Test Covered annually 10/03/2022   Prostate Cancer Screening    Prostate-Specific Antigen (PSA) Annually Lab Results   Component Value Date    PSA 1.840 08/15/2014     PSA due on 09/14/2024   Immunizations    Influenza Covered once per flu season  Please get every year -  No recommendations at this time    Pneumococcal Each vaccine (Ffwwczn27 & Uxxgzypvl67) covered once after 65 Prevnar 13: -    Scopxjwdd20: -     Pneumococcal Vaccination(1 - PCV) Never done    Hepatitis B One screening covered for patients with certain risk factors   -  No recommendations at this time    Tetanus Toxoid Not covered by Medicare Part B unless medically necessary (cut with metal); may be covered with your pharmacy prescription benefits -    Tetanus, Diptheria and Pertusis TD and TDaP Not covered by Medicare Part B -  No recommendations at this time    Zoster Not covered by Medicare Part B; may be covered with your pharmacy  prescription benefits -  Zoster Vaccines(1 of 2) Never done

## 2023-09-20 LAB
ATRIAL RATE: 51 BPM
P AXIS: 59 DEGREES
P-R INTERVAL: 130 MS
Q-T INTERVAL: 448 MS
QRS DURATION: 82 MS
QTC CALCULATION (BEZET): 412 MS
R AXIS: 9 DEGREES
T AXIS: 32 DEGREES
VENTRICULAR RATE: 51 BPM

## 2023-09-25 ENCOUNTER — LAB ENCOUNTER (OUTPATIENT)
Dept: LAB | Age: 68
End: 2023-09-25
Attending: EMERGENCY MEDICINE
Payer: MEDICARE

## 2023-09-25 DIAGNOSIS — Z11.59 NEED FOR HEPATITIS C SCREENING TEST: ICD-10-CM

## 2023-09-25 DIAGNOSIS — Z12.5 SCREENING FOR PROSTATE CANCER: ICD-10-CM

## 2023-09-25 DIAGNOSIS — Z00.00 WELCOME TO MEDICARE PREVENTIVE VISIT: ICD-10-CM

## 2023-09-25 LAB
ALBUMIN SERPL-MCNC: 3.9 G/DL (ref 3.4–5)
ALBUMIN/GLOB SERPL: 1.4 {RATIO} (ref 1–2)
ALP LIVER SERPL-CCNC: 74 U/L
ALT SERPL-CCNC: 25 U/L
ANION GAP SERPL CALC-SCNC: 4 MMOL/L (ref 0–18)
AST SERPL-CCNC: 26 U/L (ref 15–37)
BASOPHILS # BLD AUTO: 0.06 X10(3) UL (ref 0–0.2)
BASOPHILS NFR BLD AUTO: 1 %
BILIRUB SERPL-MCNC: 0.6 MG/DL (ref 0.1–2)
BUN BLD-MCNC: 9 MG/DL (ref 7–18)
CALCIUM BLD-MCNC: 8.8 MG/DL (ref 8.5–10.1)
CHLORIDE SERPL-SCNC: 107 MMOL/L (ref 98–112)
CHOLEST SERPL-MCNC: 181 MG/DL (ref ?–200)
CO2 SERPL-SCNC: 26 MMOL/L (ref 21–32)
COMPLEXED PSA SERPL-MCNC: 2.9 NG/ML (ref ?–4)
CREAT BLD-MCNC: 1 MG/DL
EGFRCR SERPLBLD CKD-EPI 2021: 83 ML/MIN/1.73M2 (ref 60–?)
EOSINOPHIL # BLD AUTO: 0.1 X10(3) UL (ref 0–0.7)
EOSINOPHIL NFR BLD AUTO: 1.7 %
ERYTHROCYTE [DISTWIDTH] IN BLOOD BY AUTOMATED COUNT: 12.9 %
FASTING PATIENT LIPID ANSWER: YES
FASTING STATUS PATIENT QL REPORTED: YES
GLOBULIN PLAS-MCNC: 2.7 G/DL (ref 2.8–4.4)
GLUCOSE BLD-MCNC: 100 MG/DL (ref 70–99)
HCT VFR BLD AUTO: 41.9 %
HCV AB SERPL QL IA: NONREACTIVE
HDLC SERPL-MCNC: 59 MG/DL (ref 40–59)
HGB BLD-MCNC: 14.1 G/DL
IMM GRANULOCYTES # BLD AUTO: 0.01 X10(3) UL (ref 0–1)
IMM GRANULOCYTES NFR BLD: 0.2 %
LDLC SERPL CALC-MCNC: 110 MG/DL (ref ?–100)
LYMPHOCYTES # BLD AUTO: 1.42 X10(3) UL (ref 1–4)
LYMPHOCYTES NFR BLD AUTO: 23.9 %
MCH RBC QN AUTO: 31.3 PG (ref 26–34)
MCHC RBC AUTO-ENTMCNC: 33.7 G/DL (ref 31–37)
MCV RBC AUTO: 93.1 FL
MONOCYTES # BLD AUTO: 0.7 X10(3) UL (ref 0.1–1)
MONOCYTES NFR BLD AUTO: 11.8 %
NEUTROPHILS # BLD AUTO: 3.65 X10 (3) UL (ref 1.5–7.7)
NEUTROPHILS # BLD AUTO: 3.65 X10(3) UL (ref 1.5–7.7)
NEUTROPHILS NFR BLD AUTO: 61.4 %
NONHDLC SERPL-MCNC: 122 MG/DL (ref ?–130)
OSMOLALITY SERPL CALC.SUM OF ELEC: 283 MOSM/KG (ref 275–295)
PLATELET # BLD AUTO: 174 10(3)UL (ref 150–450)
POTASSIUM SERPL-SCNC: 4.3 MMOL/L (ref 3.5–5.1)
PROT SERPL-MCNC: 6.6 G/DL (ref 6.4–8.2)
RBC # BLD AUTO: 4.5 X10(6)UL
SODIUM SERPL-SCNC: 137 MMOL/L (ref 136–145)
TRIGL SERPL-MCNC: 65 MG/DL (ref 30–149)
TSI SER-ACNC: 1.54 MIU/ML (ref 0.36–3.74)
VLDLC SERPL CALC-MCNC: 11 MG/DL (ref 0–30)
WBC # BLD AUTO: 5.9 X10(3) UL (ref 4–11)

## 2023-09-25 PROCEDURE — 80061 LIPID PANEL: CPT

## 2023-09-25 PROCEDURE — 85025 COMPLETE CBC W/AUTO DIFF WBC: CPT

## 2023-09-25 PROCEDURE — 86803 HEPATITIS C AB TEST: CPT

## 2023-09-25 PROCEDURE — 80053 COMPREHEN METABOLIC PANEL: CPT

## 2023-09-25 PROCEDURE — 84443 ASSAY THYROID STIM HORMONE: CPT

## 2023-09-25 PROCEDURE — 36415 COLL VENOUS BLD VENIPUNCTURE: CPT

## 2023-09-27 ENCOUNTER — TELEPHONE (OUTPATIENT)
Dept: FAMILY MEDICINE CLINIC | Facility: CLINIC | Age: 68
End: 2023-09-27

## 2023-09-27 NOTE — TELEPHONE ENCOUNTER
----- Message from Tristin Pettit MD sent at 9/26/2023  3:45 PM CDT -----  Lipids are elevated and 10 year ASCVD risk is > 10%.   Recommend statin therapy,   Pls rx Atorvastatin 10 mg daily, Pls caution on muscle ache and fatigue  Repeat lipids and CMP in 1 month

## 2023-09-28 NOTE — TELEPHONE ENCOUNTER
Pt. Called, did  not see [facility] message so I read it to him. He's not willing to start on any statins right now without doing some research about it. Will c/b when a decision is made.

## 2023-09-28 NOTE — TELEPHONE ENCOUNTER
ANURAG left for Malik Rivas. I offered that if he has any specific questions about his labs or the recommended medication to call back and I will be happy to discuss.

## 2023-10-03 LAB — AMB EXT COLOGUARD RESULT: POSITIVE

## 2023-10-12 ENCOUNTER — TELEPHONE (OUTPATIENT)
Dept: FAMILY MEDICINE CLINIC | Facility: CLINIC | Age: 68
End: 2023-10-12

## 2023-10-12 DIAGNOSIS — R19.5 POSITIVE COLORECTAL CANCER SCREENING USING COLOGUARD TEST: Primary | ICD-10-CM

## 2023-10-12 NOTE — TELEPHONE ENCOUNTER
[facility] Positive cologuard results. Entered results and placed fax in MD's bin for review.

## 2023-10-13 NOTE — TELEPHONE ENCOUNTER
Cologuard results received  Cologuard testing is POSITIVE indicating presence of either blood or cancerous cells.    Pt needs colonoscopy     Please refer patient to Surgery, Dr. Kathleen Kelly,  for colonoscopy

## 2023-10-16 ENCOUNTER — OFFICE VISIT (OUTPATIENT)
Facility: LOCATION | Age: 68
End: 2023-10-16
Payer: MEDICARE

## 2023-10-16 DIAGNOSIS — R19.5 POSITIVE COLORECTAL CANCER SCREENING USING COLOGUARD TEST: Primary | ICD-10-CM

## 2023-10-16 PROCEDURE — 99203 OFFICE O/P NEW LOW 30 MIN: CPT | Performed by: SURGERY

## 2023-10-16 RX ORDER — POLYETHYLENE GLYCOL 3350, SODIUM CHLORIDE, SODIUM BICARBONATE, POTASSIUM CHLORIDE 420; 11.2; 5.72; 1.48 G/4L; G/4L; G/4L; G/4L
POWDER, FOR SOLUTION ORAL
Qty: 1 EACH | Refills: 0 | Status: SHIPPED | OUTPATIENT
Start: 2023-10-16

## 2023-10-16 NOTE — H&P
New Patient Visit Note       Active Problems      1. Positive colorectal cancer screening using Cologuard test        Chief Complaint   Patient presents with:  Colonoscopy: NP - colonoscopy, positive cologuard, ref by Dr. Quinton Venegas - Pt denies any rectal bleeding, pain, hemorrhoids. History of Present Illness   The patient is a 73-year-old gentleman seen at the request of his primary care physician regarding the need for colonoscopy. He recently underwent Cologuard testing, on October 3, 2023, which returned as positive. The patient denies issues with constipation or diarrhea. He has no blood in the stool or unintended weight loss. He denies abdominal pain. He has never had a colonoscopy before. He has no history of abdominal surgery. He denies family history of colon cancer, colon polyps, or inflammatory bowel disease. PCP:  Breonna Hillman MD     Allergies  Tracie Burk is allergic to levofloxacin and ceclor [cefaclor]. Past Medical / Surgical / Social / Family History    The past medical and past surgical history have been reviewed by me today. Past Medical History:   Diagnosis Date    Dyslipidemia     Unspecified essential hypertension      Past Surgical History:   Procedure Laterality Date    HIP REPLACEMENT SURGERY Left 04/17/2019    OTHER  0712/22    R shoulder rotator cuff sx     OTHER SURGICAL HISTORY Right     foot surgery, bunion removal     REMOVAL OF KIDNEY STONE Left 2010    TONSILLECTOMY         The family history and social history have been reviewed by me today.     Family History   Problem Relation Age of Onset    Heart Disorder Father      Social History    Socioeconomic History      Marital status:     Tobacco Use      Smoking status: Never      Smokeless tobacco: Never    Substance and Sexual Activity      Alcohol use: Yes        Comment: rare      Drug use: No       Current Outpatient Medications:     PEG 3350-KCl-Na Bicarb-NaCl (TRILYTE) 420 g Oral Recon Soln, Starting at 4:00 pm the night before procedure, drink 8 ounces of the prep every 15-20 minutes until finished, Disp: 1 each, Rfl: 0    escitalopram 10 MG Oral Tab, Take 1 tablet (10 mg total) by mouth daily. , Disp: 90 tablet, Rfl: 0    carvedilol 12.5 MG Oral Tab, Take 1 tablet (12.5 mg total) by mouth 2 (two) times daily. , Disp: 180 tablet, Rfl: 1    Cholecalciferol (VITAMIN D) 2000 units Oral Tab, Take 5,000 Units by mouth daily. , Disp: , Rfl:     Multiple Vitamins-Minerals (MULTIVITAMIN OR), Take  by mouth., Disp: , Rfl:     Turmeric Does not apply Powder, by Does not apply route., Disp: , Rfl:     SAW PALMETTO, SERENOA REPENS, OR, Take  by mouth., Disp: , Rfl:     ASTAXANTHIN OR, Take 4 mg by mouth daily. , Disp: , Rfl:       Review of Systems  The Review of Systems has been reviewed by me during today. Review of Systems   Constitutional:  Negative for chills, diaphoresis, fatigue, fever and unexpected weight change. HENT:  Negative for hearing loss, nosebleeds, sore throat and trouble swallowing. Respiratory:  Negative for apnea, cough, shortness of breath and wheezing. Cardiovascular:  Negative for chest pain, palpitations and leg swelling. Gastrointestinal:  Negative for abdominal distention, abdominal pain, anal bleeding, blood in stool, constipation, diarrhea, nausea and vomiting. Genitourinary:  Negative for difficulty urinating, dysuria, frequency and urgency. Musculoskeletal:  Negative for arthralgias and myalgias. Skin:  Negative for color change and rash. Neurological:  Negative for tremors, syncope and weakness. Hematological:  Negative for adenopathy. Does not bruise/bleed easily. Psychiatric/Behavioral:  Negative for behavioral problems and sleep disturbance. Physical Findings   There were no vitals taken for this visit. Physical Exam  Constitutional:       Appearance: Normal appearance. He is well-developed. HENT:      Head: Normocephalic and atraumatic.    Eyes: General: No scleral icterus. Conjunctiva/sclera: Conjunctivae normal.   Neck:      Trachea: No tracheal deviation. Cardiovascular:      Rate and Rhythm: Normal rate and regular rhythm. Heart sounds: S1 normal and S2 normal. No murmur heard. Pulmonary:      Effort: Pulmonary effort is normal. No accessory muscle usage or respiratory distress. Breath sounds: No decreased breath sounds, wheezing, rhonchi or rales. Chest:      Chest wall: No mass. Abdominal:      General: Bowel sounds are normal. There is no distension. Palpations: Abdomen is soft. Abdomen is not rigid. There is no shifting dullness, fluid wave, hepatomegaly, splenomegaly, mass or pulsatile mass. Tenderness: There is no abdominal tenderness. There is no guarding or rebound. Negative signs include Alexander's sign and McBurney's sign. Hernia: No hernia is present. There is no hernia in the ventral area, left inguinal area or right inguinal area. Lymphadenopathy:      Head:      Right side of head: No submental, submandibular, preauricular, posterior auricular or occipital adenopathy. Left side of head: No submental, submandibular, preauricular, posterior auricular or occipital adenopathy. Cervical:      Right cervical: No superficial, deep or posterior cervical adenopathy. Left cervical: No superficial, deep or posterior cervical adenopathy. Upper Body:      Right upper body: No supraclavicular or pectoral adenopathy. Left upper body: No supraclavicular or pectoral adenopathy. Skin:     General: Skin is warm and dry. Neurological:      Mental Status: He is alert and oriented to person, place, and time. Psychiatric:         Speech: Speech normal.         Behavior: Behavior normal.         Thought Content:  Thought content normal.         Judgment: Judgment normal.             Assessment   Positive colorectal cancer screening using Cologuard test  (primary encounter diagnosis)      Plan   The patient was offered colonoscopy. The risks, benefits, and alternatives to colonoscopy were discussed with the patient in detail. Risks included, but are not limited to, crampy abdominal pain and perforation. The patient is agreeable to proceed with the colonoscopy. His procedure has been scheduled in the coming week.       Alyssa Beaulieu MD

## 2023-10-25 PROBLEM — R19.5 POSITIVE COLORECTAL CANCER SCREENING USING COLOGUARD TEST: Status: ACTIVE | Noted: 2023-10-25

## 2023-10-26 ENCOUNTER — PATIENT OUTREACH (OUTPATIENT)
Facility: LOCATION | Age: 68
End: 2023-10-26

## 2024-08-05 ENCOUNTER — TELEPHONE (OUTPATIENT)
Dept: FAMILY MEDICINE CLINIC | Facility: CLINIC | Age: 69
End: 2024-08-05

## 2024-08-05 NOTE — TELEPHONE ENCOUNTER
Tried to LVM for patient to schedule AWV but answering machine was glitching so I sent a  msg to schedule appt.

## 2024-09-26 ENCOUNTER — TELEPHONE (OUTPATIENT)
Dept: FAMILY MEDICINE CLINIC | Facility: CLINIC | Age: 69
End: 2024-09-26

## 2024-10-10 ENCOUNTER — OFFICE VISIT (OUTPATIENT)
Dept: FAMILY MEDICINE CLINIC | Facility: CLINIC | Age: 69
End: 2024-10-10
Payer: MEDICARE

## 2024-10-10 VITALS
OXYGEN SATURATION: 98 % | HEART RATE: 63 BPM | WEIGHT: 180 LBS | RESPIRATION RATE: 21 BRPM | DIASTOLIC BLOOD PRESSURE: 86 MMHG | BODY MASS INDEX: 27.28 KG/M2 | HEIGHT: 68 IN | SYSTOLIC BLOOD PRESSURE: 136 MMHG

## 2024-10-10 DIAGNOSIS — F39 MOOD DISORDER (HCC): ICD-10-CM

## 2024-10-10 DIAGNOSIS — Z00.00 ENCOUNTER FOR ANNUAL PHYSICAL EXAM: Primary | ICD-10-CM

## 2024-10-10 DIAGNOSIS — Z23 NEED FOR SHINGLES VACCINE: ICD-10-CM

## 2024-10-10 DIAGNOSIS — Z23 NEED FOR PNEUMOCOCCAL VACCINE: ICD-10-CM

## 2024-10-10 DIAGNOSIS — Z12.5 PROSTATE CANCER SCREENING: ICD-10-CM

## 2024-10-10 PROBLEM — M46.1 SACROILIITIS (HCC): Status: RESOLVED | Noted: 2018-08-07 | Resolved: 2024-10-10

## 2024-10-10 PROCEDURE — G0438 PPPS, INITIAL VISIT: HCPCS | Performed by: EMERGENCY MEDICINE

## 2024-10-10 NOTE — PATIENT INSTRUCTIONS
Thank you for choosing [facility]  To Do:  FOR WILLIS ELLIS    Recommend flu, shingles and pneumonia vaccines  Follow up yearly or as needed  Have blood tests done  Continue follow up with RU Mao and with Cardiology        Prevention Guidelines, Women Ages 65 and Older  Screening tests and vaccines are an important part of managing your health. Health counseling is essential, too. Below are guidelines for these, for women ages 65 and older. Talk with your healthcare provider to make sure you’re up to date on what you need.  Screening Who needs it How often   Type 2 diabetes or prediabetes All adults beginning at age 45 and adults without symptoms at any age who are overweight or obese and have 1 or more additional risk factors for diabetes At least every 3 years   Alcohol misuse All women in this age group At routine exams   Blood pressure All women in this age group Every 2 years if your blood pressure is less than 120/80 mm Hg; yearly if your systolic blood pressure is 120 to 139 mm Hg, or your diastolic blood pressure reading is 80 to 89 mm Hg   Breast cancer All women in this age group Yearly mammogram and clinical breast exam1   Cervical cancer Only women who had abnormal screening results before age 65 Talk with your healthcare provider   Chlamydia Women at increased risk for infection At routine exams   Colorectal cancer All women in this age group1 Flexible sigmoidoscopy every 5 years, or colonoscopy every 10 years, or double-contrast barium enema every 5 years; yearly fecal occult blood test or fecal immunochemical test; or a stool DNA test as often as your healthcare provider advises; talk with your healthcare provider about which tests are best for you   Depression All women in this age group At routine exams   Gonorrhea Sexually active women at increased risk for infection At routine exams   Hepatitis C Anyone at increased risk; 1 time for those born between 1945 and 1965 At routine  exams   High cholesterol or triglycerides All women in this age group who are at risk for coronary artery disease At least every 5 years   HIV Women at increased risk for infection - talk with your healthcare provider At routine exams   Lung cancer Adults age 55 to 80 who have smoked Yearly screening in smokers with 30 pack-year history of smoking or who quit within 15 years   Obesity All women in this age group At routine exams   Osteoporosis All women in this age group Bone density test at age 65, then follow-up as advised by your healthcare provider   Syphilis Women at increased risk for infection - talk with your healthcare provider At routine exams   Thyroid-Stimulating Hormone (TSH) All women in this age group Every 5 years   Tuberculosis Women at increased risk for infection - talk with your healthcare provider Ask your healthcare provider   Vision All women in this age group Every 1 to 2 years; if you have a chronic health condition, ask your healthcare provider if you need exams more often   Vaccine Who needs it How often   Chickenpox (varicella) All women in this age group who have no record of this infection or vaccine 2 doses; second dose should be given at least 4 weeks after the first dose   Hepatitis A Women at increased risk for infection - talk with your healthcare provider 2 doses given 6 months apart   Hepatitis B Women at increased risk for infection - talk with your healthcare provider 3 doses over 6 months; second dose should be given 1 month after the first dose; the third dose should be given at least 2 months after the second dose and at least 4 months after the first dose   Haemophilus influenza Type B (HIB) Women at increased risk for infection - talk with your healthcare provider 1 to 3 doses   Influenza (flu) All women in this age group Once a year   Pneumococcal conjugate vaccine (PCV13) and pneumococcal polysaccharide vaccine (PPSV23) All women in this age group 1 dose of each vaccine    Tetanus/diphtheria/pertussis (Td/Tdap) booster All women in this age group Td every 10 years, or a one-time dose of Tdap instead of a Td booster after age 18, then Td every 10 years   Zoster All women in this age group 1 dose   Counseling Who needs it How often   Diet and exercise Women who are overweight or obese When diagnosed, and then at routine exams   Fall prevention (exercise and vitamin D supplements) All women in this age group At routine exams   Sexually transmitted infection prevention Women at increased risk for infection - talk with your healthcare provider At routine exams   Use of daily aspirin Women ages 55 and up in this age group who are at risk for cardiovascular health problems such as stroke When your risk is known   Use of tobacco and the health effects it can cause All women in this age group Every exam   1American [employer] Society  Date Last Reviewed: 8/9/2015  © 4674-5567 [employer]. [address] [address]. All rights reserved. This information is not intended as a substitute for professional medical care. Always follow your healthcare professional's instructions.            Preventing Osteoporosis: Meeting Your Calcium Needs    Your body needs calcium to build and repair bones. But it can't make calcium on its own. That's why it's important to eat calcium-rich foods. Some foods are naturally rich in calcium. Others have calcium added (fortified). It's best to get calcium from the foods you eat. But if you can't get enough, you may want to take calcium supplements. To meet your daily calcium needs, try the foods listed below.  Dairy Fish & beans Other sources      Source   Calcium (mg) per serving   Source   Calcium (mg) per serving   Source   Calcium (mg) per serving      Low-fat yogurt, plain   415 mg/8 oz.   Sardines, Atlantic, canned, with bones   351 mg/3 oz.   Oatmeal, instant, fortified   215 mg/1 cup   Nonfat milk   302 mg/1 cup   [place], sockeye,  canned, with bones   239 mg/3 oz.   Tofu made with calcium sulfate   204 mg/3 oz.   Low-fat milk   297 mg/1 cup   Soybeans, fresh, boiled   131 mg/1/2 cup   Collards   179 mg/1/2 cup   Swiss cheese   272 mg/1 oz.   White beans, cooked   81 mg/1/2 cup   English muffin, whole wheat   175 mg/1 muffin   Cheddar cheese   205 mg/1 oz.   Navy beans, cooked   79 mg/1/2 cup   Kale   90 mg/1/2 cup   Ice cream strawberry   79 mg/1/2 cup           Orange, navel   56 mg/1 medium   Note: Calcium levels may vary depending on brand and size.  Daily calcium needs  14-18 years old: 1,300 mg  19-30 years old: 1,000 mg  31-50 years old: 1,000 mg  51-70 years old, women: 1,200 mg  51-70 years old, men: 1,000 mg  Pregnant or nursin-28 years old: 1,300 mg, 19-50 years old: 1,000 mg  Older than 70 (women and men): 1,200 mg   Date Last Reviewed: 10/17/2015  © 2750-3098 [employer]. [address] [place] [address], [place] [address]. All rights reserved. This information is not intended as a substitute for professional medical care. Always follow your healthcare professional's instructions.        Preventing Osteoporosis: Avoiding Bone Loss  Certain factors can speed up bone loss or decrease bone growth. For example, alcohol, cigarettes, and certain medicines reduce bone mass. Some foods make it hard for your body to absorb calcium.    Things to avoid  Here are things to avoid to help prevent osteoporosis:  Alcohol is toxic to bones. It is a major cause of bone loss. Heavy drinking can cause osteoporosis even if you have no other risk factors.  Smoking reduces bone mass. Smoking may also interfere with estrogen levels and cause early menopause.  Inactivity makes your bones lose strength and become thinner. Over time, thin bones may break. Women who aren't active are at a high risk for osteoporosis.  Certain medicines, such as cortisone, increase bone loss. They also decrease bone growth. Ask your healthcare provider about any side  effects of your medicines, and how to prevent them.  Protein-rich or salty foods eaten in large amounts may deplete calcium.  Caffeine increases calcium loss. People who drink a lot of coffee, tea, or susi lose more calcium than those who don't.  Date Last Reviewed: 10/17/2015  © 4518-1317 [employer]. [address] [place] [address], [place] [address]. All rights reserved. This information is not intended as a substitute for professional medical care. Always follow your healthcare professional's instructions.

## 2024-10-10 NOTE — PROGRESS NOTES
Subjective:   Mikey Alfaro is a 69 year old male who presents for a Initial Annual Wellness Visit (outside the first 12 months of Medicare eligibility, no prior AWV) and scheduled follow up of multiple significant but stable problems        DEPRESSION/ANXIETY  Sees psychiatry service follows up with Psychiatry Service  Follows up with Rangel Headley       HYPERTENSION  Follows up with cardiology on Carvedilol  On Carvedilol      History/Other:   Fall Risk Assessment:   He has been screened for Falls and is low risk.      Cognitive Assessment:   He had a completely normal cognitive assessment - see flowsheet entries     Functional Ability/Status:   Mikey Alfaro has a completely normal functional assessment. See flowsheet for details.        Depression Screening (PHQ):  PHQ-2 SCORE: 0  , done 10/10/2024          Advanced Directives:   He does NOT have a Living Will. [Do you have a living will?: (Patient-Rptd) No]  He does have a POA but we do NOT have it on file in Epic.    Discussed with patient          Patient Active Problem List   Diagnosis    Vegan diet    Sx: Tailor's bunionectomy right foot. (Sx 08/15/16; GEP 11/13/16)    Hypertension    Hyperlipidemia    Encounter for screening for malignant neoplasm of prostate    Family history of early CAD    Recurrent major depressive episodes, moderate (HCC)    Refuses tetanus, diphtheria, and acellular pertussis (Tdap) vaccination    Mood disorder (HCC)    Mixed obsessional thoughts and acts    Refused Streptococcus pneumoniae vaccination    Positive colorectal cancer screening using Cologuard test     Allergies:  He is allergic to levofloxacin and ceclor [cefaclor].    Current Medications:  Outpatient Medications Marked as Taking for the 10/10/24 encounter (Office Visit) with Mike Long MD   Medication Sig    [START ON 10/18/2024] escitalopram 10 MG Oral Tab Take 1 tablet (10 mg total) by mouth daily.    carvedilol 12.5 MG Oral Tab Take 1 tablet  (12.5 mg total) by mouth 2 (two) times daily.    Multiple Vitamins-Minerals (MULTIVITAMIN OR) Take  by mouth.    Turmeric Does not apply Powder by Does not apply route.    SAW PALMETTO, SERENOA REPENS, OR Take  by mouth.    ASTAXANTHIN OR Take 4 mg by mouth daily.         Medical History:  He  has a past medical history of Dyslipidemia and Unspecified essential hypertension.  Surgical History:  He  has a past surgical history that includes tonsillectomy; removal of kidney stone (Left, 2010); hip replacement surgery (Left, 04/17/2019); other surgical history (Right); and other (0712/22).   Family History:  His family history includes Heart Disorder in his father.  Social History:  He  reports that he has never smoked. He has never used smokeless tobacco. He reports current alcohol use. He reports that he does not use drugs.    Tobacco:  He has never smoked tobacco.          CAGE Alcohol Screen:   CAGE screening score of 0 on 10/10/2024, showing low risk of alcohol abuse.      Patient Care Team:  Mike Long MD as PCP - General (Family Medicine)  Emmanuel Huang MD as Consulting Physician (Cardiovascular Diseases)  Amber Disla MD (UROLOGY)  Rangel Headley APRN (Nurse Practitioner, Psychiatric/Mental Health)    Review of Systems  GENERAL: feels well otherwise  SKIN: denies any unusual skin lesions  EYES: denies blurred vision or double vision  HEENT: denies nasal congestion, sinus pain or ST  LUNGS: denies shortness of breath with exertion  CARDIOVASCULAR: denies chest pain on exertion  GI: denies abdominal pain, denies heartburn  MUSCULOSKELETAL: denies back pain  NEURO: denies headaches  PSYCHE: denies depression or anxiety  HEMATOLOGIC: denies hx of anemia  ENDOCRINE: denies thyroid history  ALL/ASTHMA: denies hx of allergy or asthma    Objective:   Physical Exam  General Appearance:  Alert, cooperative, no distress, appears stated age   Head:  Normocephalic, without obvious abnormality, atraumatic    Eyes:  PERRL, conjunctiva/corneas clear, EOM's intact, both eyes   Ears:  Normal TM's and external ear canals, both ears   Nose: Nares normal, septum midline, mucosa normal, no drainage or sinus tenderness   Throat: Lips, mucosa, and tongue normal; teeth and gums normal   Neck: Supple, symmetrical, trachea midline, no adenopathy, thyroid: not enlarged, symmetric, no tenderness/mass/nodules, no carotid bruit or JVD   Back:   Symmetric, no curvature, ROM normal, no CVA tenderness   Lungs:   Clear to auscultation bilaterally, respirations unlabored   Chest Wall:  No tenderness or deformity   Heart:  Regular rate and rhythm, S1, S2 normal, no murmur, rub or gallop   Abdomen:   Soft, non-tender, bowel sounds active all four quadrants,  no masses, no organomegaly   Extremities: Extremities normal, atraumatic, no cyanosis or edema   Pulses: 2+ and symmetric   Skin: Skin color, texture, turgor normal, no rashes or lesions   Lymph nodes: Cervical, supraclavicular, and axillary nodes normal   Neurologic: Normal     /86   Pulse 63   Resp 21   Ht 5' 8\" (1.727 m)   Wt 180 lb (81.6 kg)   SpO2 98%   BMI 27.37 kg/m²  Estimated body mass index is 27.37 kg/m² as calculated from the following:    Height as of this encounter: 5' 8\" (1.727 m).    Weight as of this encounter: 180 lb (81.6 kg).    Medicare Hearing Assessment:   Hearing Screening    Time taken: 10/10/2024  3:41 PM  Screening Method: Whisper Test  Whisper Test Result: Pass               Assessment & Plan:   Mikey Alfaro is a 69 year old male who presents for a Medicare Assessment.       1. Encounter for annual physical exam  - CBC With Differential With Platelet; Future  - Comp Metabolic Panel (14); Future  - Lipid Panel; Future  - TSH W Reflex To Free T4; Future    2. Prostate cancer screening  - PSA Total, Screen [Male over 50]; Future    3. Need for pneumococcal vaccine  Refuses.  4. Need for shingles vaccine  Refuses    5. Mood disorder (HCC)  Plan  per psychiatry service          The patient indicates understanding of these issues and agrees to the plan.  Reinforced healthy diet, lifestyle, and exercise.      No follow-ups on file.     Mike Long MD, 10/10/2024     Supplementary Documentation:   General Health:  In the past six months, have you lost more than 10 pounds without trying?: (Patient-Rptd) 2 - No  Has your appetite been poor?: (Patient-Rptd) No  Type of Diet: (Patient-Rptd) Other  How does the patient maintain a good energy level?: (Patient-Rptd) Appropriate Exercise  How would you describe your daily physical activity?: (Patient-Rptd) Moderate  How would you describe your current health state?: (Patient-Rptd) Good  How do you maintain positive mental well-being?: (Patient-Rptd) Social Interaction;Visiting Family  On a scale of 0 to 10, with 0 being no pain and 10 being severe pain, what is your pain level?: (Patient-Rptd) 0 - (None)  In the past six months, have you experienced urine leakage?: (Patient-Rptd) 0-No  At any time do you feel concerned for the safety/well-being of yourself and/or your children, in your home or elsewhere?: (Patient-Rptd) No  Have you had any immunizations at another office such as Influenza, Hepatitis B, Tetanus, or Pneumococcal?: (Patient-Rptd) No    Health Maintenance   Topic Date Due    Zoster Vaccines (1 of 2) Never done    Pneumococcal Vaccine: 65+ Years (1 of 1 - PCV) Never done    COVID-19 Vaccine (1 - 2023-24 season) Never done    Annual Physical  09/19/2024    Influenza Vaccine (1) 06/30/2025 (Originally 10/1/2024)    HTN: BP Follow-Up  11/10/2024    PSA  09/25/2025    Colorectal Cancer Screening  10/20/2033    Annual Depression Screening  Completed    Fall Risk Screening (Annual)  Completed

## 2024-10-11 ENCOUNTER — LAB ENCOUNTER (OUTPATIENT)
Dept: LAB | Age: 69
End: 2024-10-11
Attending: EMERGENCY MEDICINE
Payer: MEDICARE

## 2024-10-11 DIAGNOSIS — Z12.5 PROSTATE CANCER SCREENING: ICD-10-CM

## 2024-10-11 DIAGNOSIS — Z00.00 ENCOUNTER FOR ANNUAL PHYSICAL EXAM: ICD-10-CM

## 2024-10-11 LAB
ALBUMIN SERPL-MCNC: 4.4 G/DL (ref 3.2–4.8)
ALBUMIN/GLOB SERPL: 2 {RATIO} (ref 1–2)
ALP LIVER SERPL-CCNC: 86 U/L
ALT SERPL-CCNC: 16 U/L
ANION GAP SERPL CALC-SCNC: 5 MMOL/L (ref 0–18)
AST SERPL-CCNC: 24 U/L (ref ?–34)
BASOPHILS # BLD AUTO: 0.05 X10(3) UL (ref 0–0.2)
BASOPHILS NFR BLD AUTO: 1.1 %
BILIRUB SERPL-MCNC: 0.7 MG/DL (ref 0.2–1.1)
BUN BLD-MCNC: 8 MG/DL (ref 9–23)
CALCIUM BLD-MCNC: 9.1 MG/DL (ref 8.7–10.4)
CHLORIDE SERPL-SCNC: 103 MMOL/L (ref 98–112)
CHOLEST SERPL-MCNC: 171 MG/DL (ref ?–200)
CO2 SERPL-SCNC: 26 MMOL/L (ref 21–32)
COMPLEXED PSA SERPL-MCNC: 2.83 NG/ML (ref ?–4)
CREAT BLD-MCNC: 0.91 MG/DL
EGFRCR SERPLBLD CKD-EPI 2021: 92 ML/MIN/1.73M2 (ref 60–?)
EOSINOPHIL # BLD AUTO: 0.09 X10(3) UL (ref 0–0.7)
EOSINOPHIL NFR BLD AUTO: 1.9 %
ERYTHROCYTE [DISTWIDTH] IN BLOOD BY AUTOMATED COUNT: 13.1 %
FASTING PATIENT LIPID ANSWER: YES
FASTING STATUS PATIENT QL REPORTED: YES
GLOBULIN PLAS-MCNC: 2.2 G/DL (ref 2–3.5)
GLUCOSE BLD-MCNC: 87 MG/DL (ref 70–99)
HCT VFR BLD AUTO: 38.6 %
HDLC SERPL-MCNC: 53 MG/DL (ref 40–59)
HGB BLD-MCNC: 13.4 G/DL
IMM GRANULOCYTES # BLD AUTO: 0.01 X10(3) UL (ref 0–1)
IMM GRANULOCYTES NFR BLD: 0.2 %
LDLC SERPL CALC-MCNC: 106 MG/DL (ref ?–100)
LYMPHOCYTES # BLD AUTO: 1.16 X10(3) UL (ref 1–4)
LYMPHOCYTES NFR BLD AUTO: 25.1 %
MCH RBC QN AUTO: 31.3 PG (ref 26–34)
MCHC RBC AUTO-ENTMCNC: 34.7 G/DL (ref 31–37)
MCV RBC AUTO: 90.2 FL
MONOCYTES # BLD AUTO: 0.72 X10(3) UL (ref 0.1–1)
MONOCYTES NFR BLD AUTO: 15.6 %
NEUTROPHILS # BLD AUTO: 2.6 X10 (3) UL (ref 1.5–7.7)
NEUTROPHILS # BLD AUTO: 2.6 X10(3) UL (ref 1.5–7.7)
NEUTROPHILS NFR BLD AUTO: 56.1 %
NONHDLC SERPL-MCNC: 118 MG/DL (ref ?–130)
OSMOLALITY SERPL CALC.SUM OF ELEC: 276 MOSM/KG (ref 275–295)
PLATELET # BLD AUTO: 188 10(3)UL (ref 150–450)
POTASSIUM SERPL-SCNC: 4.2 MMOL/L (ref 3.5–5.1)
PROT SERPL-MCNC: 6.6 G/DL (ref 5.7–8.2)
RBC # BLD AUTO: 4.28 X10(6)UL
SODIUM SERPL-SCNC: 134 MMOL/L (ref 136–145)
TRIGL SERPL-MCNC: 61 MG/DL (ref 30–149)
TSI SER-ACNC: 1.62 MIU/ML (ref 0.55–4.78)
VLDLC SERPL CALC-MCNC: 10 MG/DL (ref 0–30)
WBC # BLD AUTO: 4.6 X10(3) UL (ref 4–11)

## 2024-10-11 PROCEDURE — 80061 LIPID PANEL: CPT

## 2024-10-11 PROCEDURE — 80053 COMPREHEN METABOLIC PANEL: CPT

## 2024-10-11 PROCEDURE — 36415 COLL VENOUS BLD VENIPUNCTURE: CPT

## 2024-10-11 PROCEDURE — 85025 COMPLETE CBC W/AUTO DIFF WBC: CPT

## 2024-10-11 PROCEDURE — 84443 ASSAY THYROID STIM HORMONE: CPT

## 2024-10-18 DIAGNOSIS — E78.5 ELEVATED LIPIDS: Primary | ICD-10-CM

## 2024-10-18 RX ORDER — ATORVASTATIN CALCIUM 10 MG/1
10 TABLET, FILM COATED ORAL NIGHTLY
Qty: 90 TABLET | Refills: 0 | Status: SHIPPED | OUTPATIENT
Start: 2024-10-18

## (undated) DIAGNOSIS — F33.1 MODERATE EPISODE OF RECURRENT MAJOR DEPRESSIVE DISORDER (HCC): ICD-10-CM

## (undated) NOTE — LETTER
07/22/19        Megha Rosado  [id number] [facility] [address]      Dear Philippe Maciel,    [address] [facility] records indicate that you have outstanding lab work and or testing that was ordered for you and has not yet been completed:  Orders Placed This Encounter

## (undated) NOTE — LETTER
Patient Name: Ophelia Gillespie  YOB: 1955          MRN number:  [id number]  Date:  9/11/2018  Referring Physician:  Carmen Parikh          LOWER EXTREMITY EVALUATION:    Referring Physician: Dr. Loren Richards  Diagnosis: Left Hip Capsular R patient to a high level of function. Precautions:  NoneOBJECTIVE:   Observation: The patient presents with the structural assessment of slight forward lean through the hips and pelvis. He toes out on the left side.   There is an increase in boggy effu Charges: PT Eval Low Complexity, TherAct 1      Total Timed Treatment: 40 min     Total Treatment Time: 40 min     PLAN OF CARE:    Goals (8 visits):    1. Increase FOTO assessment > 11% from INE to DC.   2. Patient will be aware of postural limitations and

## (undated) NOTE — LETTER
08/31/17        Sheldon Simons [place] [id number]      Dear Lucrecia Ruiz,    [address] [facility] records indicate that you have outstanding lab work and or testing that was ordered for you and has not yet been completed:              Occult Blood, Fe

## (undated) NOTE — Clinical Note
I had the pleasure of seeing Igor Munoz on 10/16/2023. Please see my attached note.   Beba Coppola MD FACS [facility]Surgery